# Patient Record
Sex: FEMALE | Race: WHITE | NOT HISPANIC OR LATINO | Employment: UNEMPLOYED | URBAN - METROPOLITAN AREA
[De-identification: names, ages, dates, MRNs, and addresses within clinical notes are randomized per-mention and may not be internally consistent; named-entity substitution may affect disease eponyms.]

---

## 2017-03-15 ENCOUNTER — OFFICE VISIT (OUTPATIENT)
Dept: NEUROLOGY | Facility: CLINIC | Age: 27
End: 2017-03-15

## 2017-03-15 VITALS
BODY MASS INDEX: 33.86 KG/M2 | RESPIRATION RATE: 18 BRPM | SYSTOLIC BLOOD PRESSURE: 132 MMHG | HEIGHT: 62 IN | HEART RATE: 86 BPM | WEIGHT: 184 LBS | OXYGEN SATURATION: 99 % | DIASTOLIC BLOOD PRESSURE: 96 MMHG

## 2017-03-15 DIAGNOSIS — B94.9: ICD-10-CM

## 2017-03-15 DIAGNOSIS — R20.2 PARESTHESIA: ICD-10-CM

## 2017-03-15 DIAGNOSIS — M54.2 NECK PAIN: ICD-10-CM

## 2017-03-15 DIAGNOSIS — M25.50 ARTHRALGIA OF MULTIPLE JOINTS: ICD-10-CM

## 2017-03-15 DIAGNOSIS — G43.009 MIGRAINE WITHOUT AURA AND WITHOUT STATUS MIGRAINOSUS, NOT INTRACTABLE: Primary | ICD-10-CM

## 2017-03-15 PROCEDURE — 99214 OFFICE O/P EST MOD 30 MIN: CPT | Performed by: NURSE PRACTITIONER

## 2017-03-15 RX ORDER — CYPROHEPTADINE HYDROCHLORIDE 4 MG/1
4 TABLET ORAL NIGHTLY
Qty: 30 TABLET | Refills: 5 | Status: SHIPPED | OUTPATIENT
Start: 2017-03-15 | End: 2019-06-19

## 2017-03-15 RX ORDER — SUMATRIPTAN 50 MG/1
50 TABLET, FILM COATED ORAL ONCE AS NEEDED
Qty: 9 TABLET | Refills: 5 | Status: SHIPPED | OUTPATIENT
Start: 2017-03-15 | End: 2019-06-19

## 2017-03-15 NOTE — PROGRESS NOTES
"Subjective:     Patient ID: Alexia Adam is a 26 y.o. female.    History of Present Illness   Here for 6 month follow up on post lyme disease syndrome with multiple joints/muscle pains and tingling sensations in all 4 extremities which she reports the tingling has improved since last visit. She continues to have migraines which had improved for 5 months and the last month they have returned with about 2 migraines a week that last several hours to all day. She has previously tried Topamax, Zonisamide, Amitriptyline and Gabapentin for migraines and neuropathic pains and has not tolerated any of these medications due to unwanted side effects. She tells me about 4 weeks ago she started having some muscle leg cramps along with nausea and neck stiffness which has come and gone since her Lyme disease diagnosis 12/2015. She has taken Fioricet only 1 time for a headache. She took skelaxin in past for neck stiffness but does not like the way these make her feel. She has multiple food allergies and she cannot tolerate many medications. Right now she takes Ibuprofen or Tylenol PRN migraines and lays down to sleep. She has seen her PCP who also gave her Fioricet but she tries not to use it. She is sick of feeling bad. She is willing to try anything to help her. She has tried PT in past with minimal improvement in symptoms. She also gets a \"hot poker\" pain randomly in bilateral legs or left arm which is random and lasts 2-3 minutes and then goes away without medication or intervention.     She previously saw GI and have upper GI series and was told she had in internal rash. She tried Prevacid for 2 weeks and developed a rash all over her skin and had to stop this. She also has had an intermittent dry and itchy rash come up on her legs on and off for the past few weeks. She has had no fevers or chills. She has not seen Dermatology.    MRI brain with and without contrast 6/10/2016 which was within normal limits with no " abnormal enhancing lesions and also had MRI C spine with and without contrast showing C2-C3 and C3-C4 small disc osteophytes noted but no neuro foraminal narrowing or spinal stenosis noted and no abnormal enhancing lesions. She also had EMG/NCVS BLE 8/16/2016 which was WNL.    The following portions of the patient's history were reviewed and updated as appropriate: allergies, current medications, past family history, past medical history, past social history, past surgical history and problem list.    Review of Systems   Constitutional: Positive for fatigue. Negative for chills, fever and unexpected weight change.        FEELING POORLY   HENT: Negative for ear pain, hearing loss, nosebleeds, rhinorrhea and sore throat.    Eyes: Positive for itching. Negative for photophobia, pain, discharge and visual disturbance.   Respiratory: Negative for cough, chest tightness, shortness of breath and wheezing.    Cardiovascular: Negative for chest pain, palpitations and leg swelling.   Gastrointestinal: Positive for nausea. Negative for abdominal pain, blood in stool, constipation, diarrhea and vomiting.   Genitourinary: Negative for dysuria, frequency, hematuria and urgency.   Musculoskeletal: Positive for joint swelling. Negative for arthralgias, back pain, gait problem, myalgias, neck pain and neck stiffness.        JOINT STIFFNESS   Skin: Negative for rash and wound.        ITCHING   Allergic/Immunologic: Negative for environmental allergies and food allergies.   Neurological: Positive for dizziness, weakness, numbness and headaches. Negative for tremors, seizures, syncope, speech difficulty and light-headedness.        TINGLING   Hematological: Negative for adenopathy. Bruises/bleeds easily.   Psychiatric/Behavioral: Negative for agitation, confusion, decreased concentration, hallucinations, sleep disturbance and suicidal ideas. The patient is not nervous/anxious.         Objective:    Neurologic Exam     Mental Status    Oriented to person, place, and time.   Registration: recalls 3 of 3 objects. Recall at 5 minutes: recalls 3 of 3 objects. Follows 3 step commands.   Attention: normal. Concentration: normal.   Speech: speech is normal   Level of consciousness: alert  Knowledge: good and consistent with education. Able to perform simple calculations.   Able to name object. Able to read. Able to repeat. Able to write. Normal comprehension.     Cranial Nerves     CN II   Visual fields full to confrontation.     CN III, IV, VI   Extraocular motions are normal.   Right pupil: Size: 4 mm. Shape: irregular (ovoid pupil chronic). Reactivity: brisk. Consensual response: intact. Accommodation: intact.   Left pupil: Size: 4 mm. Shape: regular. Reactivity: brisk. Consensual response: intact. Accommodation: intact.   CN III: no CN III palsy  CN VI: no CN VI palsy  Nystagmus: none   Diplopia: none  Ophthalmoparesis: none    CN V   Facial sensation intact.     CN VII   Facial expression full, symmetric.     CN VIII   CN VIII normal.     CN IX, X   CN IX normal.   CN X normal.     CN XI   CN XI normal.     CN XII   CN XII normal.        With EOMs gets dizzy and nauseated     Motor Exam   Muscle bulk: normal  Overall muscle tone: normal    Strength   Strength 5/5 throughout.     Sensory Exam   Light touch normal.   Vibration normal.   Proprioception normal.   Pinprick normal.        Hyperesthesias present but improved in all 4 extremities     Gait, Coordination, and Reflexes     Gait  Gait: normal    Coordination   Romberg: negative  Finger to nose coordination: normal  Heel to shin coordination: normal    Tremor   Resting tremor: absent  Intention tremor: absent  Action tremor: absent    Reflexes   Right brachioradialis: 2+  Left brachioradialis: 2+  Right biceps: 2+  Left biceps: 2+  Right triceps: 2+  Left triceps: 2+  Right patellar: 2+  Left patellar: 2+  Right achilles: 2+  Left achilles: 2+  Right : 2+  Left : 2+  Right plantar:  normal  Left plantar: normal  Right Abraham: absent  Left Abraham: absent  Right ankle clonus: absent  Left ankle clonus: absent       No hyperreflexia today.       Physical Exam   Constitutional: She is oriented to person, place, and time. She appears well-developed and well-nourished.   Eyes: EOM are normal.   Fundoscopic exam:       The right eye shows red reflex.        The left eye shows red reflex.   Neck: Muscular tenderness present. No spinous process tenderness present. No rigidity. Decreased range of motion present. No edema and no erythema present. No Brudzinski's sign and no Kernig's sign noted.   Neurological: She is oriented to person, place, and time. She has normal strength. She has a normal Finger-Nose-Finger Test, a normal Heel to Bedolla Test and a normal Romberg Test. Gait normal.   Reflex Scores:       Tricep reflexes are 2+ on the right side and 2+ on the left side.       Bicep reflexes are 2+ on the right side and 2+ on the left side.       Brachioradialis reflexes are 2+ on the right side and 2+ on the left side.       Patellar reflexes are 2+ on the right side and 2+ on the left side.       Achilles reflexes are 2+ on the right side and 2+ on the left side.  Psychiatric: She has a normal mood and affect. Her speech is normal and behavior is normal. Judgment and thought content normal. Cognition and memory are normal.       Assessment/Plan:     Alexia was seen today for headache, muscle pain and numbness.    Diagnoses and all orders for this visit:    Migraine without aura and without status migrainosus, not intractable  -     cyproheptadine (PERIACTIN) 4 MG tablet; Take 1 tablet by mouth Every Night.  -     SUMAtriptan (IMITREX) 50 MG tablet; Take 1 tablet by mouth 1 (One) Time As Needed for migraine for up to 1 dose.    Sequelae of infectious or parasitic diseases    Arthralgia of multiple joints    Paresthesia    Neck pain         F/U with PCP for rash and may consider seeing Dermatology.  Recommended she see Chiropractor for possible adjustments and treatment of neck pains and multiple arthralgias since diagnosis of Lyme Disease 12/2015. Also gave her names of several Naturopathic Providers in [place], [place] to consult with since she has had poor response to several prescription medications. We will try cyproheptadine for migraine prevention with severe known food allergies. Imitrex PRN headaches. F/U in 8 weeks to re-eval neck pain and migraines. She will call us with sooner concerns. Reviewed medications, potential side effects and signs and symptoms to report. Discussed risk versus benefits of treatment plan with patient and/or family-including medications, labs and radiology that may be ordered. Addressed questions and concerns during visit. Patient and/or family verbalized understanding and agree with plan. Total time of visit 25 minutes with >50% of time spent face to face counseling patient. If symptoms persist, we order additional labs next visit.

## 2017-08-07 ENCOUNTER — TRANSCRIBE ORDERS (OUTPATIENT)
Dept: ADMINISTRATIVE | Facility: HOSPITAL | Age: 27
End: 2017-08-07

## 2017-08-07 DIAGNOSIS — M79.604 DIFFUSE PAIN IN RIGHT LOWER EXTREMITY: Primary | ICD-10-CM

## 2017-08-17 ENCOUNTER — HOSPITAL ENCOUNTER (OUTPATIENT)
Dept: CARDIOLOGY | Facility: HOSPITAL | Age: 27
Discharge: HOME | End: 2017-08-17
Admitting: NURSE PRACTITIONER

## 2017-08-17 DIAGNOSIS — M79.604 DIFFUSE PAIN IN RIGHT LOWER EXTREMITY: ICD-10-CM

## 2017-08-17 LAB
BH CV LOWER VASCULAR LEFT COMMON FEMORAL AUGMENT: NORMAL
BH CV LOWER VASCULAR LEFT COMMON FEMORAL COMPRESS: NORMAL
BH CV LOWER VASCULAR LEFT COMMON FEMORAL PHASIC: NORMAL
BH CV LOWER VASCULAR LEFT COMMON FEMORAL SPONT: NORMAL
BH CV LOWER VASCULAR LEFT DISTAL FEMORAL COMPRESS: NORMAL
BH CV LOWER VASCULAR LEFT GASTRONEMIUS COMPRESS: NORMAL
BH CV LOWER VASCULAR LEFT GREATER SAPH AK COMPRESS: NORMAL
BH CV LOWER VASCULAR LEFT GREATER SAPH BK COMPRESS: NORMAL
BH CV LOWER VASCULAR LEFT LESSER SAPH COMPRESS: NORMAL
BH CV LOWER VASCULAR LEFT MID FEMORAL AUGMENT: NORMAL
BH CV LOWER VASCULAR LEFT MID FEMORAL COMPRESS: NORMAL
BH CV LOWER VASCULAR LEFT MID FEMORAL PHASIC: NORMAL
BH CV LOWER VASCULAR LEFT MID FEMORAL SPONT: NORMAL
BH CV LOWER VASCULAR LEFT PERONEAL COMPRESS: NORMAL
BH CV LOWER VASCULAR LEFT POPLITEAL AUGMENT: NORMAL
BH CV LOWER VASCULAR LEFT POPLITEAL COMPRESS: NORMAL
BH CV LOWER VASCULAR LEFT POPLITEAL PHASIC: NORMAL
BH CV LOWER VASCULAR LEFT POPLITEAL SPONT: NORMAL
BH CV LOWER VASCULAR LEFT POSTERIOR TIBIAL COMPRESS: NORMAL
BH CV LOWER VASCULAR LEFT PROXIMAL FEMORAL COMPRESS: NORMAL
BH CV LOWER VASCULAR LEFT SAPHENOFEMORAL JUNCTION AUGMENT: NORMAL
BH CV LOWER VASCULAR LEFT SAPHENOFEMORAL JUNCTION COMPRESS: NORMAL
BH CV LOWER VASCULAR LEFT SAPHENOFEMORAL JUNCTION PHASIC: NORMAL
BH CV LOWER VASCULAR LEFT SAPHENOFEMORAL JUNCTION SPONT: NORMAL
BH CV LOWER VASCULAR RIGHT COMMON FEMORAL AUGMENT: NORMAL
BH CV LOWER VASCULAR RIGHT COMMON FEMORAL COMPRESS: NORMAL
BH CV LOWER VASCULAR RIGHT COMMON FEMORAL PHASIC: NORMAL
BH CV LOWER VASCULAR RIGHT COMMON FEMORAL SPONT: NORMAL
BH CV LOWER VASCULAR RIGHT DISTAL FEMORAL COMPRESS: NORMAL
BH CV LOWER VASCULAR RIGHT GASTRONEMIUS COMPRESS: NORMAL
BH CV LOWER VASCULAR RIGHT GREATER SAPH AK COMPRESS: NORMAL
BH CV LOWER VASCULAR RIGHT GREATER SAPH BK COMPRESS: NORMAL
BH CV LOWER VASCULAR RIGHT LESSER SAPH COMPRESS: NORMAL
BH CV LOWER VASCULAR RIGHT MID FEMORAL AUGMENT: NORMAL
BH CV LOWER VASCULAR RIGHT MID FEMORAL COMPRESS: NORMAL
BH CV LOWER VASCULAR RIGHT MID FEMORAL PHASIC: NORMAL
BH CV LOWER VASCULAR RIGHT MID FEMORAL SPONT: NORMAL
BH CV LOWER VASCULAR RIGHT PERONEAL COMPRESS: NORMAL
BH CV LOWER VASCULAR RIGHT POPLITEAL AUGMENT: NORMAL
BH CV LOWER VASCULAR RIGHT POPLITEAL COMPRESS: NORMAL
BH CV LOWER VASCULAR RIGHT POPLITEAL PHASIC: NORMAL
BH CV LOWER VASCULAR RIGHT POPLITEAL SPONT: NORMAL
BH CV LOWER VASCULAR RIGHT POSTERIOR TIBIAL COMPRESS: NORMAL
BH CV LOWER VASCULAR RIGHT PROXIMAL FEMORAL COMPRESS: NORMAL
BH CV LOWER VASCULAR RIGHT SAPHENOFEMORAL JUNCTION AUGMENT: NORMAL
BH CV LOWER VASCULAR RIGHT SAPHENOFEMORAL JUNCTION COMPRESS: NORMAL
BH CV LOWER VASCULAR RIGHT SAPHENOFEMORAL JUNCTION PHASIC: NORMAL
BH CV LOWER VASCULAR RIGHT SAPHENOFEMORAL JUNCTION SPONT: NORMAL

## 2017-08-17 PROCEDURE — 93970 EXTREMITY STUDY: CPT

## 2018-04-19 ENCOUNTER — TRANSCRIBE ORDERS (OUTPATIENT)
Dept: ADMINISTRATIVE | Facility: HOSPITAL | Age: 28
End: 2018-04-19

## 2018-04-19 DIAGNOSIS — R10.9 FLANK PAIN: Primary | ICD-10-CM

## 2018-04-20 ENCOUNTER — HOSPITAL ENCOUNTER (OUTPATIENT)
Dept: CT IMAGING | Facility: HOSPITAL | Age: 28
Discharge: HOME | End: 2018-04-20
Admitting: NURSE PRACTITIONER

## 2018-04-20 DIAGNOSIS — R10.9 FLANK PAIN: ICD-10-CM

## 2018-04-20 PROCEDURE — 74176 CT ABD & PELVIS W/O CONTRAST: CPT

## 2018-06-21 ENCOUNTER — TRANSCRIBE ORDERS (OUTPATIENT)
Dept: ADMINISTRATIVE | Facility: HOSPITAL | Age: 28
End: 2018-06-21

## 2018-06-21 DIAGNOSIS — R11.2 NAUSEA AND VOMITING, INTRACTABILITY OF VOMITING NOT SPECIFIED, UNSPECIFIED VOMITING TYPE: Primary | ICD-10-CM

## 2018-06-29 ENCOUNTER — LAB REQUISITION (OUTPATIENT)
Dept: LAB | Facility: HOSPITAL | Age: 28
End: 2018-06-29

## 2018-06-29 ENCOUNTER — HOSPITAL ENCOUNTER (OUTPATIENT)
Dept: ULTRASOUND IMAGING | Facility: HOSPITAL | Age: 28
Discharge: HOME | End: 2018-06-29
Attending: INTERNAL MEDICINE | Admitting: INTERNAL MEDICINE

## 2018-06-29 DIAGNOSIS — R11.2 NAUSEA AND VOMITING, INTRACTABILITY OF VOMITING NOT SPECIFIED, UNSPECIFIED VOMITING TYPE: ICD-10-CM

## 2018-06-29 DIAGNOSIS — R11.2 NAUSEA WITH VOMITING: ICD-10-CM

## 2018-06-29 PROCEDURE — 88305 TISSUE EXAM BY PATHOLOGIST: CPT | Performed by: INTERNAL MEDICINE

## 2018-06-29 PROCEDURE — 76705 ECHO EXAM OF ABDOMEN: CPT

## 2018-07-02 LAB
CYTO UR: NORMAL
LAB AP CASE REPORT: NORMAL
LAB AP CLINICAL INFORMATION: NORMAL
PATH REPORT.FINAL DX SPEC: NORMAL
PATH REPORT.GROSS SPEC: NORMAL

## 2019-06-17 ENCOUNTER — TRANSCRIBE ORDERS (OUTPATIENT)
Dept: ADMINISTRATIVE | Facility: HOSPITAL | Age: 29
End: 2019-06-17

## 2019-06-17 ENCOUNTER — HOSPITAL ENCOUNTER (OUTPATIENT)
Dept: GENERAL RADIOLOGY | Facility: HOSPITAL | Age: 29
Discharge: HOME | End: 2019-06-17
Admitting: STUDENT IN AN ORGANIZED HEALTH CARE EDUCATION/TRAINING PROGRAM

## 2019-06-17 DIAGNOSIS — M54.42 ACUTE MIDLINE LOW BACK PAIN WITH BILATERAL SCIATICA: Primary | ICD-10-CM

## 2019-06-17 DIAGNOSIS — M54.41 ACUTE MIDLINE LOW BACK PAIN WITH BILATERAL SCIATICA: Primary | ICD-10-CM

## 2019-06-17 PROCEDURE — 72114 X-RAY EXAM L-S SPINE BENDING: CPT

## 2019-06-19 ENCOUNTER — OFFICE VISIT (OUTPATIENT)
Dept: NEUROLOGY | Facility: CLINIC | Age: 29
End: 2019-06-19

## 2019-06-19 VITALS
HEIGHT: 62 IN | WEIGHT: 186 LBS | OXYGEN SATURATION: 97 % | HEART RATE: 64 BPM | DIASTOLIC BLOOD PRESSURE: 92 MMHG | SYSTOLIC BLOOD PRESSURE: 146 MMHG | BODY MASS INDEX: 34.23 KG/M2

## 2019-06-19 DIAGNOSIS — M54.41 ACUTE MIDLINE LOW BACK PAIN WITH BILATERAL SCIATICA: ICD-10-CM

## 2019-06-19 DIAGNOSIS — M79.10 MYALGIA: Primary | ICD-10-CM

## 2019-06-19 DIAGNOSIS — R20.2 PARESTHESIA: ICD-10-CM

## 2019-06-19 DIAGNOSIS — M25.40 JOINT SWELLING: ICD-10-CM

## 2019-06-19 DIAGNOSIS — M54.42 ACUTE MIDLINE LOW BACK PAIN WITH BILATERAL SCIATICA: ICD-10-CM

## 2019-06-19 DIAGNOSIS — R21 RASH: ICD-10-CM

## 2019-06-19 PROCEDURE — 99214 OFFICE O/P EST MOD 30 MIN: CPT | Performed by: NURSE PRACTITIONER

## 2019-06-19 RX ORDER — CYCLOBENZAPRINE HCL 5 MG
5 TABLET ORAL 3 TIMES DAILY PRN
COMMUNITY
Start: 2019-06-13 | End: 2019-08-15

## 2019-06-19 RX ORDER — MELOXICAM 15 MG/1
15 TABLET ORAL DAILY
COMMUNITY
Start: 2019-06-13 | End: 2019-08-15

## 2019-06-19 NOTE — PROGRESS NOTES
Subjective:     Patient ID: Alexia Adam is a 28 y.o. female.    CC:   Chief Complaint   Patient presents with   • Pain   • Gait Problem   • Numbness       HPI:   History of Present Illness   This is a 27 y/o female who presents after 2 year hiatus to discuss worsening and new symptoms. She tells me her Rheumatologist recommended she come back to neurology. She was previously followed and last seen 3/15/2017 in our office for post lyme disease syndrome with multiple joints/muscle pains and tingling sensations in all 4 extremities. She was also experiencing migraines at the time. She has previously tried Topamax, Zonisamide, Amitriptyline and Gabapentin for migraines and neuropathic pains and has not tolerated any of these medications due to unwanted side effects. Lyme disease diagnosis 12/2015. She denies any migraines in the past several months.    She tells me a about 2 months ago she started having a weird rash on her arms and hands.  She tells me that she was given a steroid orally and a cream and this is clearing up somewhat but the right wrist rash is still present.  She tells me she also started having really hot and swollen hands and feet over the past 2 to 3 months.  She tells me she recently saw rheumatology at [place] [address] but she cannot remember which provider and she tells me that initially when she went and she did not have the blood work done because of some type of personal issue but this time 1 month ago she went in and did have all of her labs checked.  She tells me she is not sure about the results yet.  She tells me several tests were run and she is waiting to hear.  She is scheduled to follow-up with them soon.  She tells me that they recommended that she come back here because they were not entirely convinced that she had post Lyme disease.     Prior Extensive Workup: MRI brain with and without contrast 6/10/2016 which was within normal limits with no abnormal enhancing lesions and also had  MRI C spine with and without contrast showing C2-C3 and C3-C4 small disc osteophytes noted but no neuro foraminal narrowing or spinal stenosis noted and no abnormal enhancing lesions. She also had EMG/NCVS BLE 8/16/2016 which was WNL.  She also underwent a lumbar puncture on November 30, 2015 and the Lyme disease PCR was negative on CSF, glucose CV SF was 52, protein CSF normal at 25, cell count CSF normal, culture CSF negative, cryptococcus antigen negative, rheumatoid factor at that time normal.  CRP was normal.  CMP was within normal limits.  Sed rate was normal at 6 and CBC with differential was within normal limits.  She had been diagnosed with Lyme disease by her primary care provider about 1.5 months prior to her LP/ER workup at [facility].    She also has a new concern today of acute middle lower back pain present x1 week.  She went and saw her primary care provider Dr. Grigsby last week and had x-rays of her lumbar spine which she reports were normal.  She tells me she has had no injury but has suddenly had a severe pain in the midline lower back which radiates with numbness and tingling into both hips and down both legs.  She denies any urinary or bowel incontinence or hesitancy, denies perineal numbness.  She denies weakness in her lower extremities but has severe pain with sitting, standing or any movement with her lower back.  She denies any UTI symptoms abdominal pain or urinary frequency or dysuria.  She has been taking a muscle relaxer Flexeril as well as meloxicam for the past week with no improvement in symptoms.  She has not had a recent steroid.  She has not gone to physical therapy.  She is tearful and in excruciating pain today.    The following portions of the patient's history were reviewed and updated as appropriate: allergies, current medications, past family history, past medical history, past social history, past surgical history and problem list.    Past Medical History:   Diagnosis Date   •  Lyme disease, acute    • Pupillary abnormalities 01/14/2016    resolved       Past Surgical History:   Procedure Laterality Date   • TONSILECTOMY, ADENOIDECTOMY, BILATERAL MYRINGOTOMY AND TUBES         Social History     Socioeconomic History   • Marital status: Single     Spouse name: Not on file   • Number of children: Not on file   • Years of education: Not on file   • Highest education level: Not on file   Tobacco Use   • Smoking status: Never Smoker   Substance and Sexual Activity   • Alcohol use: No   • Drug use: No       Family History   Problem Relation Age of Onset   • Heart disease Mother         cardiac disorder   • Hypertension Mother    • Cancer Mother         Review of Systems   Constitutional: Negative for chills, fatigue, fever and unexpected weight change.   HENT: Negative for ear pain, hearing loss, nosebleeds, rhinorrhea and sore throat.    Eyes: Negative for photophobia, pain, discharge, itching and visual disturbance.   Respiratory: Negative for cough, chest tightness, shortness of breath and wheezing.    Cardiovascular: Negative for chest pain, palpitations and leg swelling.   Gastrointestinal: Negative for abdominal pain, blood in stool, constipation, diarrhea, nausea and vomiting.   Genitourinary: Negative for dysuria, frequency, hematuria and urgency.   Musculoskeletal: Positive for back pain, gait problem and neck stiffness. Negative for arthralgias, joint swelling, myalgias and neck pain.        LEGS   Skin: Negative for rash and wound.   Allergic/Immunologic: Negative for environmental allergies and food allergies.   Neurological: Positive for numbness. Negative for dizziness, tremors, seizures, syncope, speech difficulty, weakness, light-headedness and headaches.   Hematological: Negative for adenopathy. Does not bruise/bleed easily.   Psychiatric/Behavioral: Negative for agitation, confusion, decreased concentration, hallucinations, sleep disturbance and suicidal ideas. The patient is  not nervous/anxious.         Objective:    Neurologic Exam     Mental Status   Oriented to person, place, and time.   Speech: speech is normal   Level of consciousness: alert    Cranial Nerves     CN II   Visual fields full to confrontation.     CN III, IV, VI   Right pupil: Size: 4 (ovoid pupil chronic) mm. Shape: regular. Reactivity: brisk. Consensual response: intact. Accommodation: intact.   Left pupil: Size: 4 mm. Shape: regular. Reactivity: brisk. Consensual response: intact. Accommodation: intact.   CN III: no CN III palsy  CN VI: no CN VI palsy  Nystagmus: none   Diplopia: none  Ophthalmoparesis: none  Upgaze: normal  Downgaze: normal    CN V   Facial sensation intact.     CN VII   Facial expression full, symmetric.     CN VIII   CN VIII normal.     CN IX, X   CN IX normal.   CN X normal.     CN XI   CN XI normal.     CN XII   CN XII normal.     Dizziness with EOMs has been chronic since 2015     Motor Exam   Muscle bulk: normal  Overall muscle tone: normal    Strength   Strength 5/5 except as noted.   Severely limited ROM of Lumbar spine     Sensory Exam   Right arm light touch: normal  Left arm light touch: normal  Right leg light touch: decreased from ankle  Left leg light touch: decreased from ankle  Right arm vibration: normal  Left arm vibration: normal  Right leg vibration: decreased from ankle  Left leg vibration: decreased from ankle  Proprioception normal.   Pinprick normal.     Gait, Coordination, and Reflexes     Gait  Gait: (antalgic, stooped d/t LBP)    Coordination   Finger to nose coordination: normal    Tremor   Resting tremor: absent  Intention tremor: absent  Action tremor: absent    Reflexes   Right brachioradialis: 2+  Left brachioradialis: 2+  Right biceps: 2+  Left biceps: 2+  Right triceps: 2+  Left triceps: 2+  Right patellar: 2+  Left patellar: 2+  Right achilles: 2+  Left achilles: 2+  Right : 2+  Left : 2+  Right plantar: normal  Left plantar: normal      Physical Exam    Constitutional: She is oriented to person, place, and time.   Musculoskeletal:        Lumbar back: She exhibits decreased range of motion, tenderness, bony tenderness and pain. She exhibits no swelling, no edema, no deformity, no laceration and no spasm.     +SLR severe-patient crying and grimacing during exam   Neurological: She is oriented to person, place, and time. She has a normal Finger-Nose-Finger Test.   Reflex Scores:       Tricep reflexes are 2+ on the right side and 2+ on the left side.       Bicep reflexes are 2+ on the right side and 2+ on the left side.       Brachioradialis reflexes are 2+ on the right side and 2+ on the left side.       Patellar reflexes are 2+ on the right side and 2+ on the left side.       Achilles reflexes are 2+ on the right side and 2+ on the left side.  Skin: Rash (both hands and wrists, faint, erythematous, lacy rash) noted.   Psychiatric: Her speech is normal and behavior is normal. Judgment and thought content normal. Her mood appears anxious. Cognition and memory are normal.       Assessment/Plan:       Alexia was seen today for pain, gait problem and numbness.    Diagnoses and all orders for this visit:    Myalgia  Comments:  requested recent labs from rheumatology-collected 5/2019-pending    Acute midline low back pain with bilateral sciatica  Comments:  xray L spine by PCP 1 wk ago normal, denies injury, taking meloxicam and flexeril, mild relief  Orders:  -     Ambulatory Referral to Physical Therapy Evaluate and treat  -     MRI Lumbar Spine Without Contrast; Future    Paresthesia  Comments:  hands and feet chronic since 2015. requested recent labs from rheumatology-collected 5/2019-pending    Joint swelling  Comments:  hands and feet x 2-3 months. requested recent labs from rheumatology-collected 5/2019-pending    Rash  Comments:  hands x 2 months. treated steroids and cream slightly better           I have requested recent labs from rheumatology as well as most  recent visit notes to see any other further recommendations for work-up with neurology.  We could always repeat her EMG and NCV S.  Would like to ensure they did check her CK but she does deny any weakness today.  She does have acute findings of severe lower back pain with radicular symptoms.  I have ordered physical therapy but have also recommended going ahead and doing an MRI of the lumbar spine without contrast to rule out herniated disks.  She will continue her meloxicam and muscle relaxer.  She does have swelling as well as a rash on the hands and we will wait to see what rheumatology has to say.  Could also consider seeing dermatology. Have her follow-up in 4 to 6 weeks for reevaluation.  We will call her with the results of the MRI of the lumbar spine.  I did review signs and symptoms of cauda equina syndrome as well as any significant perineal numbness, weakness or worsening symptoms to go to the ER immediately for further work-up. Her mother is with her today and they both verbalize understanding. Reviewed medications, potential side effects and signs and symptoms to report. Discussed risk versus benefits of treatment plan with patient and/or family-including medications, labs and radiology that may be ordered. Addressed questions and concerns during visit. Patient and/or family verbalized understanding and agree with plan.    During this visit the following were done:  Labs Reviewed [x]    Labs Ordered []    Radiology Reports Reviewed [x]    Radiology Ordered [x]    PCP Records Reviewed [x]    Referring Provider Records Reviewed []    ER Records Reviewed []    Hospital Records Reviewed []    History Obtained From Family []    Radiology Images Reviewed []    Other Reviewed []    Records Requested [x] rheumatology all records and recent labs/recommendations     EMR Dragon/Transcription Disclaimer:  Much of this encounter note is an electronic transcription of spoken language to printed text. Electronic  transcription of spoken language may permit erroneous words or phrases to be inadvertently transcribed. Although I have reviewed the note for such errors, some may still exist in this documentation.    Sandy Mclaughlin, APRN  6/20/2019

## 2019-06-25 ENCOUNTER — TELEPHONE (OUTPATIENT)
Dept: NEUROLOGY | Facility: CLINIC | Age: 29
End: 2019-06-25

## 2019-06-25 ENCOUNTER — HOSPITAL ENCOUNTER (OUTPATIENT)
Dept: MRI IMAGING | Facility: HOSPITAL | Age: 29
Discharge: HOME | End: 2019-06-25
Admitting: NURSE PRACTITIONER

## 2019-06-25 DIAGNOSIS — M54.41 ACUTE MIDLINE LOW BACK PAIN WITH BILATERAL SCIATICA: ICD-10-CM

## 2019-06-25 DIAGNOSIS — M54.42 ACUTE MIDLINE LOW BACK PAIN WITH BILATERAL SCIATICA: ICD-10-CM

## 2019-06-25 PROCEDURE — 72148 MRI LUMBAR SPINE W/O DYE: CPT

## 2019-06-25 NOTE — TELEPHONE ENCOUNTER
----- Message from Sharlene Basurto sent at 6/25/2019  7:54 AM EDT -----  Regarding: RACHELE LOVELACE FROM [facility] CALLED [phone]  EXT  [id number] REFERENCE #[id number] TO CONFIRM DIAGNOSIS AND TREATMENT PLAN, AND KEEPING HER OFF WORK, AND RESTRICTIONS

## 2019-06-27 DIAGNOSIS — M51.36 BULGING LUMBAR DISC: Primary | ICD-10-CM

## 2019-06-28 ENCOUNTER — TELEPHONE (OUTPATIENT)
Dept: NEUROLOGY | Facility: CLINIC | Age: 29
End: 2019-06-28

## 2019-06-28 NOTE — TELEPHONE ENCOUNTER
----- Message from DIMITRI George sent at 6/28/2019  9:24 AM EDT -----  Discussed MRI L spine findings by phone on 6/27/19 with patient and made referral to Neurosurgery for further evaluation. She is in severe pain. She is aware to await details of appointment with neurosurgery for further recommendations. She is going to PT, but tells me it is exacerbating her symptoms. DIMITRI Marshall

## 2019-06-28 NOTE — PROGRESS NOTES
Discussed MRI L spine findings by phone on 6/27/19 with patient and made referral to Neurosurgery for further evaluation. She is in severe pain. She is aware to await details of appointment with neurosurgery for further recommendations. She is going to PT, but tells me it is exacerbating her symptoms. KELLY Mclaughlin, APRN

## 2019-07-03 ENCOUNTER — OFFICE VISIT (OUTPATIENT)
Dept: NEUROSURGERY | Facility: CLINIC | Age: 29
End: 2019-07-03

## 2019-07-03 VITALS — BODY MASS INDEX: 34.78 KG/M2 | WEIGHT: 189 LBS | TEMPERATURE: 98.2 F | HEIGHT: 62 IN

## 2019-07-03 DIAGNOSIS — M54.9 MECHANICAL BACK PAIN: Primary | ICD-10-CM

## 2019-07-03 DIAGNOSIS — M51.36 DEGENERATIVE DISC DISEASE, LUMBAR: ICD-10-CM

## 2019-07-03 DIAGNOSIS — M51.36 BULGING LUMBAR DISC: ICD-10-CM

## 2019-07-03 PROCEDURE — 99243 OFF/OP CNSLTJ NEW/EST LOW 30: CPT | Performed by: NEUROLOGICAL SURGERY

## 2019-07-03 RX ORDER — DIPHENHYDRAMINE HCL 25 MG
25 TABLET ORAL AS NEEDED
COMMUNITY
End: 2020-11-09 | Stop reason: HOSPADM

## 2019-07-03 NOTE — PROGRESS NOTES
Patient: Alexia Adam  : 1990    Primary Care Provider: Murray Wei APRN    Requesting Provider: As above        History    Chief Complaint: Low back pain.    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  She simply awoke with the symptoms.  She denies any precipitating or inciting events.  She has just started physical therapy.  She has had tingling in her legs since  when she was diagnosed with Lyme disease.  Ibuprofen is helpful.  She does have some pain extending into the back of her thighs.  She is better with sitting and worse with standing or walking.  She is not currently working.    Review of Systems   Constitutional: Positive for fatigue. Negative for activity change, appetite change, chills, diaphoresis, fever and unexpected weight change.   HENT: Negative for congestion, dental problem, drooling, ear discharge, ear pain, facial swelling, hearing loss, mouth sores, nosebleeds, postnasal drip, rhinorrhea, sinus pressure, sinus pain, sneezing, sore throat, tinnitus, trouble swallowing and voice change.    Eyes: Negative for photophobia, pain, discharge, redness, itching and visual disturbance.   Respiratory: Negative for apnea, cough, choking, chest tightness, shortness of breath, wheezing and stridor.    Cardiovascular: Positive for leg swelling. Negative for chest pain and palpitations.   Gastrointestinal: Positive for nausea. Negative for abdominal distention, abdominal pain, anal bleeding, blood in stool, constipation, diarrhea, rectal pain and vomiting.   Endocrine: Positive for polyuria. Negative for cold intolerance, heat intolerance, polydipsia and polyphagia.   Genitourinary: Negative for decreased urine volume, difficulty urinating, dyspareunia, dysuria, enuresis, flank pain, frequency, genital sores, hematuria, menstrual problem, pelvic pain, urgency, vaginal bleeding, vaginal  "discharge and vaginal pain.   Musculoskeletal: Positive for arthralgias, back pain, joint swelling, myalgias and neck pain. Negative for gait problem and neck stiffness.   Skin: Negative for color change, pallor, rash and wound.   Allergic/Immunologic: Positive for food allergies. Negative for environmental allergies and immunocompromised state.   Neurological: Positive for weakness. Negative for dizziness, tremors, seizures, syncope, facial asymmetry, speech difficulty, light-headedness, numbness and headaches.   Hematological: Negative for adenopathy. Does not bruise/bleed easily.   Psychiatric/Behavioral: Negative for agitation, behavioral problems, confusion, decreased concentration, dysphoric mood, hallucinations, self-injury, sleep disturbance and suicidal ideas. The patient is not nervous/anxious and is not hyperactive.        The patient's past medical history, past surgical history, family history, and social history have been reviewed at length in the electronic medical record.    Physical Exam:   Temp 98.2 °F (36.8 °C) (Temporal)   Ht 157.5 cm (62\")   Wt 85.7 kg (189 lb)   BMI 34.57 kg/m²   CONSTITUTIONAL: Patient is well-nourished, pleasant and appears stated age.  CV: Heart regular rate and rhythm without murmur, rub, or gallop.  PULMONARY: Lungs are clear to ascultation.  MUSCULOSKELETAL:  Straight leg raising is negative.  Jose Alfredo's Sign is negative.  ROM in back normal.  Tenderness in the back to palpation is rather dramatically present in her low back.  NEUROLOGICAL:  Orientation, memory, attention span, language function, and cognition have been examined and are intact.  Strength is intact in the lower extremities to direct testing.  Muscle tone is normal throughout.  Station and gait are normal.  Sensation is intact to light touch testing throughout.  Deep tendon reflexes are 1+ and symmetrical.  Coordination is intact.      Medical Decision Making    Data Review:   MRI of the lumbar spine " dated 6/25/2019 demonstrates some loss of signal at L5-S1 where there is some central to left-sided protrusion.  No significant canal compromise is noted.    Diagnosis:   1..  Lumbar degenerative disc disease.  I do not see evidence of an active radiculopathy.  2.  Mechanical low back pain.    Treatment Options:   I have referred her for more physical therapy and treatment should remain symptomatic.  She will follow-up in our clinic in several weeks.       Diagnosis Plan   1. Lumbar radiculopathy  Ambulatory Referral to Physical Therapy Evaluate and treat; Stretching, ROM, Strengthening   2. Bulging lumbar disc     3. Degenerative disc disease, lumbar         Scribed for Yony Garzon MD by Milvia Baptiste CMA on 07/03/2019 at 8:32 AM      I, Dr. Garzon, personally performed the services described in the documentation, as scribed in my presence, and it is both accurate and complete.

## 2019-07-08 ENCOUNTER — TELEPHONE (OUTPATIENT)
Dept: NEUROLOGY | Facility: CLINIC | Age: 29
End: 2019-07-08

## 2019-07-08 NOTE — TELEPHONE ENCOUNTER
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  Dr. Krueger is seeing her again in four weeks.   He will reevaluate then. Pt wants to know if you think she should stay off work or go back.

## 2019-07-08 NOTE — TELEPHONE ENCOUNTER
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  Dr. Krueger is seeing her again in four weeks.   He will reevaluate then. Pt wants to know if you think she should stay off work or go back.    I never addressed the patient's work status. I would recommend she complete physical therapy per Dr. Garzon and our recommendation and stay off work until she comes in for follow up on 7/31/2019 and then we should be able to release her OR if she wants to come in sooner I can release her if she feels she is ready to go back. We want her back as soon as possible, but she needs to get some PT completed so she can return to work without further issues. Thanks.

## 2019-07-09 NOTE — TELEPHONE ENCOUNTER
Pt is aware of the recommendations concerning PT per Dr Parker's office and to remain off work until she comes in for a f/u.  Pt opted to keep her 7-

## 2019-07-31 ENCOUNTER — OFFICE VISIT (OUTPATIENT)
Dept: NEUROLOGY | Facility: CLINIC | Age: 29
End: 2019-07-31

## 2019-07-31 VITALS
HEART RATE: 99 BPM | DIASTOLIC BLOOD PRESSURE: 80 MMHG | OXYGEN SATURATION: 99 % | WEIGHT: 192 LBS | BODY MASS INDEX: 35.33 KG/M2 | HEIGHT: 62 IN | SYSTOLIC BLOOD PRESSURE: 138 MMHG

## 2019-07-31 DIAGNOSIS — M79.7 FIBROMYALGIA: Primary | ICD-10-CM

## 2019-07-31 DIAGNOSIS — M51.36 DDD (DEGENERATIVE DISC DISEASE), LUMBAR: ICD-10-CM

## 2019-07-31 DIAGNOSIS — M54.59 MECHANICAL LOW BACK PAIN: ICD-10-CM

## 2019-07-31 PROBLEM — M51.369 DDD (DEGENERATIVE DISC DISEASE), LUMBAR: Status: ACTIVE | Noted: 2019-07-31

## 2019-07-31 PROCEDURE — 99214 OFFICE O/P EST MOD 30 MIN: CPT | Performed by: NURSE PRACTITIONER

## 2019-07-31 RX ORDER — DULOXETIN HYDROCHLORIDE 30 MG/1
30 CAPSULE, DELAYED RELEASE ORAL DAILY
Qty: 30 CAPSULE | Refills: 2 | Status: SHIPPED | OUTPATIENT
Start: 2019-07-31 | End: 2019-12-09 | Stop reason: SDUPTHER

## 2019-07-31 RX ORDER — ALBUTEROL SULFATE 2.5 MG/3ML
2.5 SOLUTION RESPIRATORY (INHALATION) EVERY 4 HOURS PRN
COMMUNITY
End: 2019-08-15

## 2019-07-31 RX ORDER — METHYLPREDNISOLONE 4 MG/1
TABLET ORAL
Qty: 21 EACH | Refills: 0 | Status: SHIPPED | OUTPATIENT
Start: 2019-07-31 | End: 2019-08-15

## 2019-07-31 NOTE — PROGRESS NOTES
Subjective:     Patient ID: Aleixa Adam is a 29 y.o. female.    CC:   Chief Complaint   Patient presents with   • Fibromyalgia   • Back Pain       HPI:   History of Present Illness   This is a 30 y/o female who presents for 6 week follow up on acute LBP present about 7 weeks with no known injury as well as chronic pains following possible lyme disease in 2015.  Last visit she has had an MRI of the lumbar spine without contrast completed on 6/25/2019 showing a disc bulge with left-sided nerve root contact and she has been evaluated by neurosurgery Dr. Krueger on 7/3/2019 and he has diagnosed her with lumbar degenerative disc disease without an active radiculopathy as well as mechanical lower back pain.  He recommended she try physical therapy and he is scheduled to see her on 8/7/2019 for follow-up.  She tells me that she has been going to physical therapy but she seems to get worse after each visit.  She tells me she has used the meloxicam and the Flexeril as needed but the Flexeril does not really help and just makes her sleep.  She has not done a recent steroid.  She has constant low back pain and is very tender to touch in the lumbar spine region.  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  She does want to be able to return to work and is motivated to get better.  She is not able to return to work with any restrictions. Pain is moderate and staying the same. Worse with moving, bending, twisting and standing, better with sitting and laying flat.    Recently saw Rheumatology again here locally and they did additional extensive lab work which was unremarkable and reviewing notes and they recommended she follow up with our office to treat her for Fibromyalgia. She was told by [facility] in 2015 she likely had this as well. Has chronic numbness, tingling, burning sensation x all 4 extremities. Desperate for relief and willing to try a different  medication. Migraines are episodic and only every few months now.  She has previously tried Topamax, Zonisamide, Amitriptyline and Gabapentin for migraines and neuropathic pains and has not tolerated any of these medications due to unwanted side effects. Lyme disease diagnosis 12/2015.      Prior Extensive Workup: MRI brain with and without contrast 6/10/2016 which was within normal limits with no abnormal enhancing lesions and also had MRI C spine with and without contrast showing C2-C3 and C3-C4 small disc osteophytes noted but no neuro foraminal narrowing or spinal stenosis noted and no abnormal enhancing lesions. She also had EMG/NCVS BLE 8/16/2016 which was WNL.  She also underwent a lumbar puncture on November 30, 2015 and the Lyme disease PCR was negative on CSF, glucose CV SF was 52, protein CSF normal at 25, cell count CSF normal, culture CSF negative, cryptococcus antigen negative, rheumatoid factor at that time normal.  CRP was normal.  CMP was within normal limits.  Sed rate was normal at 6 and CBC with differential was within normal limits.  She had been diagnosed with Lyme disease by her primary care provider about 1.5 months prior to her LP/ER workup at [facility].    The following portions of the patient's history were reviewed and updated as appropriate: allergies, current medications, past family history, past medical history, past social history, past surgical history and problem list.    Past Medical History:   Diagnosis Date   • Asthma    • Lyme disease, acute    • Pupillary abnormalities 01/14/2016    resolved       Past Surgical History:   Procedure Laterality Date   • TONSILECTOMY, ADENOIDECTOMY, BILATERAL MYRINGOTOMY AND TUBES         Social History     Socioeconomic History   • Marital status: Single     Spouse name: Not on file   • Number of children: Not on file   • Years of education: Not on file   • Highest education level: Not on file   Tobacco Use   • Smoking status: Never Smoker   •  Smokeless tobacco: Never Used   Substance and Sexual Activity   • Alcohol use: No   • Drug use: No       Family History   Problem Relation Age of Onset   • Heart disease Mother         cardiac disorder   • Hypertension Mother    • Cancer Mother         Review of Systems   Constitutional: Negative for chills, fatigue, fever and unexpected weight change.   HENT: Negative for ear pain, hearing loss, nosebleeds, rhinorrhea and sore throat.    Eyes: Negative for photophobia, pain, discharge, itching and visual disturbance.   Respiratory: Negative for cough, chest tightness, shortness of breath and wheezing.    Cardiovascular: Negative for chest pain, palpitations and leg swelling.   Gastrointestinal: Negative for abdominal pain, blood in stool, constipation, diarrhea, nausea and vomiting.   Genitourinary: Negative for dysuria, frequency, hematuria and urgency.   Musculoskeletal: Positive for back pain. Negative for arthralgias, gait problem, joint swelling, myalgias, neck pain and neck stiffness.   Skin: Negative for rash and wound.   Allergic/Immunologic: Negative for environmental allergies and food allergies.   Neurological: Positive for headaches. Negative for dizziness, tremors, seizures, syncope, speech difficulty, weakness, light-headedness and numbness.   Hematological: Negative for adenopathy. Does not bruise/bleed easily.   Psychiatric/Behavioral: Negative for agitation, confusion, decreased concentration, hallucinations, sleep disturbance and suicidal ideas. The patient is not nervous/anxious.    All other systems reviewed and are negative.       Objective:    Neurologic Exam     Mental Status   Oriented to person, place, and time.   Level of consciousness: alert    Cranial Nerves   Cranial nerves II through XII intact.     CN III, IV, VI   Extraocular motions are normal.   Pupils: unequal  Right pupil: Size: 4 (ovoid-chronic) mm. Shape: regular. Reactivity: brisk. Consensual response: intact.   Left pupil:  Size: 4 (round) mm. Shape: regular. Reactivity: brisk. Consensual response: intact.     Motor Exam   Muscle bulk: normal  Overall muscle tone: normal    Strength   Strength 5/5 throughout.     Sensory Exam   Right leg light touch: normal  Left leg light touch: normal  Right leg vibration: normal  Left leg vibration: normal  Right leg proprioception: normal  Left leg proprioception: normal  Right leg pinprick: normal  Left leg pinprick: normal    Gait, Coordination, and Reflexes     Gait  Gait: (antalgic d/t lbp, guarded)    Coordination   Finger to nose coordination: normal    Tremor   Resting tremor: absent  Intention tremor: absent  Action tremor: absent    Reflexes   Right brachioradialis: 2+  Left brachioradialis: 2+  Right biceps: 2+  Left biceps: 2+  Right triceps: 2+  Left triceps: 2+  Right patellar: 2+  Left patellar: 2+  Right achilles: 2+  Left achilles: 2+  Right : 2+  Left : 2+  Right plantar: normal  Left plantar: normal      Physical Exam   Constitutional: She is oriented to person, place, and time.   Eyes: EOM are normal. Pupils are unequal.   Musculoskeletal:        Right shoulder: She exhibits tenderness.        Left shoulder: She exhibits tenderness.        Right wrist: She exhibits tenderness.        Left wrist: She exhibits tenderness.        Right knee: Tenderness found.        Left knee: Tenderness found.        Right ankle: Tenderness.        Left ankle: Tenderness.        Lumbar back: She exhibits decreased range of motion, tenderness, bony tenderness, pain and spasm.   Neurological: She is oriented to person, place, and time. She has normal strength. She has a normal Finger-Nose-Finger Test.   Reflex Scores:       Tricep reflexes are 2+ on the right side and 2+ on the left side.       Bicep reflexes are 2+ on the right side and 2+ on the left side.       Brachioradialis reflexes are 2+ on the right side and 2+ on the left side.       Patellar reflexes are 2+ on the right side and 2+  on the left side.       Achilles reflexes are 2+ on the right side and 2+ on the left side.  Psychiatric: Her mood appears anxious (tearful d/t pain).       Assessment/Plan:       Alexia was seen today for fibromyalgia and back pain.    Diagnoses and all orders for this visit:    Fibromyalgia  -     DULoxetine (CYMBALTA) 30 MG capsule; Take 1 capsule by mouth Daily.    DDD (degenerative disc disease), lumbar  Comments:  continue PT and f/u with neurosurgery 8/7/19  Orders:  -     methylPREDNISolone (MEDROL) 4 MG tablet; Take as directed on package instructions.    Mechanical low back pain  Comments:  continue PT and f/u with neurosurgery 8/7/19  Orders:  -     methylPREDNISolone (MEDROL) 4 MG tablet; Take as directed on package instructions.           We are going to give her a trial of Cymbalta 30 mg daily.  We are going to have her follow-up in our office in 8 weeks for reevaluation.  If she cannot tolerate the medication or see some improvement but not enough she can call for an increase in dose before she returns.  She should continue physical therapy.  I have given her a short Medrol Dosepak for inflammation to help since she is going to therapy.  I have encouraged her to keep her follow-up with neurosurgery on 8/7/2019 to discuss further recommendations.  At this time I do not believe she could return to work without restrictions but will defer to neurosurgery upon further recommendations.  Her goal is to return to work and she is very motivated to get better quickly. Reviewed medications, potential side effects and signs and symptoms to report. Discussed risk versus benefits of treatment plan with patient and/or family-including medications, labs and radiology that may be ordered. Addressed questions and concerns during visit. Patient and/or family verbalized understanding and agree with plan.    During this visit the following were done:  Labs Reviewed [x]    Labs Ordered []    Radiology Reports Reviewed  [x]    Radiology Ordered []    PCP Records Reviewed []    Referring Provider Records Reviewed []    ER Records Reviewed []    Hospital Records Reviewed []    History Obtained From Family []    Radiology Images Reviewed [x]    Other Reviewed [x]    Records Requested []      EMR Dragon/Transcription Disclaimer:  Much of this encounter note is an electronic transcription of spoken language to printed text. Electronic transcription of spoken language may permit erroneous words or phrases to be inadvertently transcribed. Although I have reviewed the note for such errors, some may still exist in this documentation.    Sandy Mclaughlin, APRN  7/31/2019

## 2019-08-07 ENCOUNTER — TELEPHONE (OUTPATIENT)
Dept: NEUROLOGY | Facility: CLINIC | Age: 29
End: 2019-08-07

## 2019-08-07 ENCOUNTER — OFFICE VISIT (OUTPATIENT)
Dept: NEUROSURGERY | Facility: CLINIC | Age: 29
End: 2019-08-07

## 2019-08-07 VITALS — BODY MASS INDEX: 35.33 KG/M2 | WEIGHT: 192 LBS | TEMPERATURE: 98.2 F | HEIGHT: 62 IN

## 2019-08-07 DIAGNOSIS — M51.36 DEGENERATIVE DISC DISEASE, LUMBAR: ICD-10-CM

## 2019-08-07 DIAGNOSIS — M51.36 BULGING LUMBAR DISC: ICD-10-CM

## 2019-08-07 DIAGNOSIS — M54.9 MECHANICAL BACK PAIN: Primary | ICD-10-CM

## 2019-08-07 PROCEDURE — 99213 OFFICE O/P EST LOW 20 MIN: CPT | Performed by: PHYSICIAN ASSISTANT

## 2019-08-07 NOTE — PROGRESS NOTES
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  Additionally, she describes a tingling sensation to her bilateral lower extremities since  when she was diagnosed with Lyme disease.  Her low back complaints are pinpoint over the region where she had a lumbar puncture/blood patch.  Symptoms in her legs increase with standing >30 minutes, and improved with sitting.  She was recently diagnosed with fibromyalgia and started on Cymbalta-which has started to help mitigate symptom severity.  Since last seen in the office, she is attended 3 sessions of physical therapy -which reportedly aggravated her low back issues.  She continues to deny bowel or bladder dysfunction, neurogenic claudication-like symptoms, or overt weakness.  She has no other complaints at this time.      Review of Systems   Constitutional: Negative for activity change, appetite change, chills, diaphoresis, fatigue, fever and unexpected weight change.   HENT: Negative for congestion, dental problem, drooling, ear discharge, ear pain, facial swelling, hearing loss, mouth sores, nosebleeds, postnasal drip, rhinorrhea, sinus pressure, sinus pain, sneezing, sore throat, tinnitus, trouble swallowing and voice change.    Eyes: Negative for photophobia, pain, discharge, redness, itching and visual disturbance.   Respiratory: Negative for apnea, cough, choking, chest tightness, shortness of breath, wheezing and stridor.    Cardiovascular: Negative for chest pain, palpitations and leg swelling.   Gastrointestinal: Negative for abdominal distention, abdominal pain, anal  "bleeding, blood in stool, constipation, diarrhea, nausea, rectal pain and vomiting.   Endocrine: Negative for cold intolerance, heat intolerance, polydipsia, polyphagia and polyuria.   Genitourinary: Negative for decreased urine volume, difficulty urinating, dyspareunia, dysuria, enuresis, flank pain, frequency, genital sores, hematuria, menstrual problem, pelvic pain, urgency, vaginal bleeding, vaginal discharge and vaginal pain.   Musculoskeletal: Negative for arthralgias, back pain, gait problem, joint swelling, myalgias, neck pain and neck stiffness.   Skin: Negative for color change, pallor, rash and wound.   Allergic/Immunologic: Negative for environmental allergies, food allergies and immunocompromised state.   Neurological: Positive for weakness (BLE), numbness (BLE) and headaches. Negative for dizziness, tremors, seizures, syncope, facial asymmetry, speech difficulty and light-headedness.   Hematological: Negative for adenopathy. Does not bruise/bleed easily.   Psychiatric/Behavioral: Negative for agitation, behavioral problems, confusion, decreased concentration, dysphoric mood, hallucinations, self-injury, sleep disturbance and suicidal ideas. The patient is not nervous/anxious and is not hyperactive.        The patient's past medical history, past surgical history, family history, and social history have been reviewed at length in the electronic medical record.    Physical Exam:   Temp 98.2 °F (36.8 °C) (Temporal)   Ht 157.5 cm (62\")   Wt 87.1 kg (192 lb)   BMI 35.12 kg/m²      MUSCULOSKELETAL:  - Back tenderness to palpation is not observed.   - ROM in back is normal.  - Straight leg raise is negative bilaterally   - Jose Alfredo's sign is negative   NEUROLOGICAL:  - A&Ox3  - Attention span, language function, and cognition are intact.  - Strength is intact in the upper and lower extremities to direct testing.  - Muscle tone is normal throughout.  - Station and gait are normal.  - Sensation is intact to " light touch testing throughout.  - Deep tendon reflexes are 1+ and symmetrical.    - Joyce's Sign is negative bilaterally.  - No clonus is elicited.  - Coordination is intact.    Medical Decision Making    Data Review:   1. MRI Lumbar Spine (6/25/19): left-sided disc protrusion at L5-S1.  Otherwise no significant canal or root compromise.     Diagnosis/Treatment Options:  1. Mechanical back pain  2. Bulging lumbar disc  3. Degenerative disc disease, lumbar       Follow up:  Ms. Adam is seen today in follow-up with continued pinpoint low back pain, and bilateral leg sensory alteration despite time, conservative measures, and physical therapy.  Unfortunately, there is no surgical intervention warranted at this time.  We recommend that she continue with her Cymbalta.  She is cleared to return to work from a neurosurgical standpoint.  Patient will follow-up in our office on an as-needed basis.    Shilpa Lester PA-C   8/7/2019   2:06 PM

## 2019-08-07 NOTE — TELEPHONE ENCOUNTER
----- Message from Musa May sent at 8/7/2019  3:14 PM EDT -----  Regarding: Sandy - Release to Work  Contact: [phone]  Patient called to leave a message for Sandy. She would like a work release for 8/19/2019. She wanted to know if she needed to make a follow up appointment to see Sandy. Patient can be reached at [phone].

## 2019-08-07 NOTE — TELEPHONE ENCOUNTER
She normally has to be seen to have a release to work, however, I did review her neurosurgery note from today and they do give her clearance to return to work. I am happy to write a work excuse, but what is the reason for the date of 8/19/19? If it is not for this week or next week I do need to see her. Thanks. She is scheduled for 9/25/19 to f/u on her cymbalta and fibromyalgia.

## 2019-08-08 NOTE — TELEPHONE ENCOUNTER
Pt is wanting to wait until 8- because she was hoping the cymbalta would kick in by then and help with some of the pain.  She went on and scheduled another appt for 8- to discuss the work release note in detail.

## 2019-08-15 ENCOUNTER — OFFICE VISIT (OUTPATIENT)
Dept: NEUROLOGY | Facility: CLINIC | Age: 29
End: 2019-08-15

## 2019-08-15 VITALS
WEIGHT: 193 LBS | DIASTOLIC BLOOD PRESSURE: 82 MMHG | OXYGEN SATURATION: 99 % | BODY MASS INDEX: 35.51 KG/M2 | SYSTOLIC BLOOD PRESSURE: 118 MMHG | HEART RATE: 59 BPM | HEIGHT: 62 IN

## 2019-08-15 DIAGNOSIS — M51.36 DDD (DEGENERATIVE DISC DISEASE), LUMBAR: ICD-10-CM

## 2019-08-15 DIAGNOSIS — M79.7 FIBROMYALGIA: Primary | ICD-10-CM

## 2019-08-15 DIAGNOSIS — M54.59 MECHANICAL LOW BACK PAIN: ICD-10-CM

## 2019-08-15 PROCEDURE — 99213 OFFICE O/P EST LOW 20 MIN: CPT | Performed by: NURSE PRACTITIONER

## 2019-08-15 RX ORDER — ALBUTEROL SULFATE 90 UG/1
2 AEROSOL, METERED RESPIRATORY (INHALATION) AS NEEDED
COMMUNITY
End: 2020-11-09 | Stop reason: HOSPADM

## 2019-08-15 NOTE — PROGRESS NOTES
Subjective:     Patient ID: Alexia Adam is a 29 y.o. female.    CC:   Chief Complaint   Patient presents with   • Fibromyalgia       HPI:   History of Present Illness   This is a 30 y/o female who presents for 2 week follow up on acute LBP present about 9 weeks with no known injury as well as chronic pains following possible lyme disease in 2015.  Underwent MRI of the lumbar spine without contrast completed on 6/25/2019 showing a disc bulge with left-sided nerve root contact and she has been evaluated by neurosurgery Dr. Krueger on 7/3/2019 and he has diagnosed her with lumbar degenerative disc disease without an active radiculopathy as well as mechanical lower back pain. She completed PT. She followed up with Neurosurgery on 8/7/19 and no surgical intervention was recommended and she was given full clearance to return to work. She feels like her lower back pain is improving. She is here for a work note to return to work Monday 8/19/19.     We also diagnosed her with Fibromyalgia last visit and she has started Cymbalta 30mg daily and just over the past 2 weeks feels like her myalgias and generalized pains are improving. She reports no side effects. Recently saw Rheumatology again here locally and they did additional extensive lab work which was unremarkable and reviewing notes and they recommended she follow up with our office to treat her for Fibromyalgia. She was told by [facility] in 2015 she likely had this as well. Has chronic numbness, tingling, burning sensation x all 4 extremities. Desperate for relief and willing to try a different medication. Migraines are episodic and only every few months now.  She has previously tried Topamax, Zonisamide, Amitriptyline and Gabapentin for migraines and neuropathic pains and has not tolerated any of these medications due to unwanted side effects. Lyme disease diagnosis 12/2015.      Prior Extensive Workup: MRI brain with and without contrast 6/10/2016 which  was within normal limits with no abnormal enhancing lesions and also had MRI C spine with and without contrast showing C2-C3 and C3-C4 small disc osteophytes noted but no neuro foraminal narrowing or spinal stenosis noted and no abnormal enhancing lesions. She also had EMG/NCVS BLE 8/16/2016 which was WNL.  She also underwent a lumbar puncture on November 30, 2015 and the Lyme disease PCR was negative on CSF, glucose CV SF was 52, protein CSF normal at 25, cell count CSF normal, culture CSF negative, cryptococcus antigen negative, rheumatoid factor at that time normal.  CRP was normal.  CMP was within normal limits.  Sed rate was normal at 6 and CBC with differential was within normal limits.  She had been diagnosed with Lyme disease by her primary care provider about 1.5 months prior to her LP/ER workup at [facility].    The following portions of the patient's history were reviewed and updated as appropriate: allergies, current medications, past family history, past medical history, past social history, past surgical history and problem list.    Past Medical History:   Diagnosis Date   • Asthma    • Lyme disease, acute    • Pupillary abnormalities 01/14/2016    resolved       Past Surgical History:   Procedure Laterality Date   • TONSILECTOMY, ADENOIDECTOMY, BILATERAL MYRINGOTOMY AND TUBES         Social History     Socioeconomic History   • Marital status: Single     Spouse name: Not on file   • Number of children: Not on file   • Years of education: Not on file   • Highest education level: Not on file   Tobacco Use   • Smoking status: Never Smoker   • Smokeless tobacco: Never Used   Substance and Sexual Activity   • Alcohol use: No   • Drug use: No       Family History   Problem Relation Age of Onset   • Heart disease Mother         cardiac disorder   • Hypertension Mother    • Cancer Mother         Review of Systems   Constitutional: Negative for chills, fatigue, fever and unexpected weight change.   HENT: Negative  for ear pain, hearing loss, nosebleeds, rhinorrhea and sore throat.    Eyes: Negative for photophobia, pain, discharge, itching and visual disturbance.   Respiratory: Negative for cough, chest tightness, shortness of breath and wheezing.    Cardiovascular: Negative for chest pain, palpitations and leg swelling.   Gastrointestinal: Negative for abdominal pain, blood in stool, constipation, diarrhea, nausea and vomiting.   Genitourinary: Negative for dysuria, frequency, hematuria and urgency.   Musculoskeletal: Positive for back pain and myalgias. Negative for arthralgias, gait problem, joint swelling, neck pain and neck stiffness.   Skin: Negative for rash and wound.   Allergic/Immunologic: Negative for environmental allergies and food allergies.   Neurological: Negative for dizziness, tremors, seizures, syncope, speech difficulty, weakness, light-headedness, numbness and headaches.   Hematological: Negative for adenopathy. Does not bruise/bleed easily.   Psychiatric/Behavioral: Negative for agitation, confusion, decreased concentration, hallucinations, sleep disturbance and suicidal ideas. The patient is not nervous/anxious.         Objective:    Neurologic Exam  Mental Status   Oriented to person, place, and time.   Level of consciousness: alert     Cranial Nerves   Cranial nerves II through XII intact.      CN III, IV, VI   Extraocular motions are normal.   Pupils: unequal  Right pupil: Size: 4 (ovoid-chronic) mm. Shape: regular. Reactivity: brisk. Consensual response: intact.   Left pupil: Size: 4 (round) mm. Shape: regular. Reactivity: brisk. Consensual response: intact.      Motor Exam   Muscle bulk: normal  Overall muscle tone: normal     Strength   Strength 5/5 throughout.      Sensory Exam   Right leg light touch: normal  Left leg light touch: normal  Right leg vibration: normal  Left leg vibration: normal  Right leg proprioception: normal  Left leg proprioception: normal  Right leg pinprick: normal  Left  leg pinprick: normal     Gait, Coordination, and Reflexes     Gait: normal   Coordination   Finger to nose coordination: normal     Tremor   Resting tremor: absent  Intention tremor: absent  Action tremor: absent     Reflexes   Right brachioradialis: 2+  Left brachioradialis: 2+  Right biceps: 2+  Left biceps: 2+  Right triceps: 2+  Left triceps: 2+  Right patellar: 2+  Left patellar: 2+  Right achilles: 2+  Left achilles: 2+  Right : 2+  Left : 2+  Right plantar: normal  Left plantar: normal     Physical Exam  Constitutional: She is oriented to person, place, and time.   Neurological: She is oriented to person, place, and time. She has normal strength. She has a normal Finger-Nose-Finger Test.   Reflex Scores:       Tricep reflexes are 2+ on the right side and 2+ on the left side.       Bicep reflexes are 2+ on the right side and 2+ on the left side.       Brachioradialis reflexes are 2+ on the right side and 2+ on the left side.       Patellar reflexes are 2+ on the right side and 2+ on the left side.       Achilles reflexes are 2+ on the right side and 2+ on the left side.  Psychiatric: Her mood appears anxious    Assessment/Plan:       Alexia was seen today for fibromyalgia.    Diagnoses and all orders for this visit:    Fibromyalgia  Comments:  continue Cymbalta. Consider increasing in the future based on symptom relief.    DDD (degenerative disc disease), lumbar  Comments:  no surgical interventions. completed PT. Ensure good body mechanics.    Mechanical low back pain  Comments:  completed PT. Good body mechanics.         From a neurological standpoint she is cleared to return to work without restrictions on 8/19/19. She will be on Cymbalta for her Fibromyalgia a full 2 weeks by that point. We will f/u in 6 weeks for re-eval and to see if we need to adjust her medication. Encouraged her to continue home PT exercises and good body mechanics. Reviewed medications, potential side effects and signs and  symptoms to report. Discussed risk versus benefits of treatment plan with patient and/or family-including medications, labs and radiology that may be ordered. Addressed questions and concerns during visit. Patient and/or family verbalized understanding and agree with plan.    During this visit the following were done:  Labs Reviewed []    Labs Ordered []    Radiology Reports Reviewed []    Radiology Ordered []    PCP Records Reviewed []    Referring Provider Records Reviewed []    ER Records Reviewed []    Hospital Records Reviewed []    History Obtained From Family []    Radiology Images Reviewed []    Other Reviewed [x] neurosurgery notes   Records Requested []      EMR Dragon/Transcription Disclaimer:  Much of this encounter note is an electronic transcription of spoken language to printed text. Electronic transcription of spoken language may permit erroneous words or phrases to be inadvertently transcribed. Although I have reviewed the note for such errors, some may still exist in this documentation.      Sandy Mclaughlin, APRN  8/15/2019

## 2019-12-09 ENCOUNTER — OFFICE VISIT (OUTPATIENT)
Dept: NEUROLOGY | Facility: CLINIC | Age: 29
End: 2019-12-09

## 2019-12-09 VITALS
WEIGHT: 186 LBS | HEART RATE: 110 BPM | DIASTOLIC BLOOD PRESSURE: 80 MMHG | BODY MASS INDEX: 34.23 KG/M2 | SYSTOLIC BLOOD PRESSURE: 130 MMHG | HEIGHT: 62 IN | OXYGEN SATURATION: 97 %

## 2019-12-09 DIAGNOSIS — R25.1 TREMOR OF LEFT HAND: ICD-10-CM

## 2019-12-09 DIAGNOSIS — M79.7 FIBROMYALGIA: Primary | ICD-10-CM

## 2019-12-09 DIAGNOSIS — M51.36 DDD (DEGENERATIVE DISC DISEASE), LUMBAR: ICD-10-CM

## 2019-12-09 DIAGNOSIS — R25.3 FASCICULATIONS OF MUSCLE: ICD-10-CM

## 2019-12-09 DIAGNOSIS — M54.59 MECHANICAL LOW BACK PAIN: ICD-10-CM

## 2019-12-09 DIAGNOSIS — G43.009 MIGRAINE WITHOUT AURA AND WITHOUT STATUS MIGRAINOSUS, NOT INTRACTABLE: ICD-10-CM

## 2019-12-09 DIAGNOSIS — R29.898 LEFT HAND WEAKNESS: ICD-10-CM

## 2019-12-09 DIAGNOSIS — R25.2 CRAMP OF EXTREMITY: ICD-10-CM

## 2019-12-09 PROBLEM — G47.62 NOCTURNAL LEG CRAMPS: Status: ACTIVE | Noted: 2019-12-09

## 2019-12-09 PROCEDURE — 99214 OFFICE O/P EST MOD 30 MIN: CPT | Performed by: NURSE PRACTITIONER

## 2019-12-09 RX ORDER — DULOXETIN HYDROCHLORIDE 30 MG/1
30 CAPSULE, DELAYED RELEASE ORAL DAILY
Qty: 30 CAPSULE | Refills: 5 | Status: SHIPPED | OUTPATIENT
Start: 2019-12-09 | End: 2020-09-15

## 2019-12-09 RX ORDER — OXCARBAZEPINE 300 MG/1
TABLET, FILM COATED ORAL
Qty: 60 TABLET | Refills: 5 | Status: SHIPPED | OUTPATIENT
Start: 2019-12-09 | End: 2020-09-15

## 2019-12-09 NOTE — PROGRESS NOTES
Subjective:     Patient ID: Alexia Adam is a 29 y.o. female.    CC:   Chief Complaint   Patient presents with   • Fibromyalgia       HPI:   History of Present Illness   This is a 28 y/o female who presents for 3 month follow up on chronic pains following possible lyme disease in 2015.      She is concerned because she is having a trembling in both of her hands but more so in the left hand. This was present intermittently over past few months but now daily and persistent. This is at rest as well as with movement.  She also tells me she is having severe leg spasms and drawing up of bilateral thighs especially in the evening but can be throughout the day.  She has been taking off work about 4 to 6 days/month due to just feeling bad and weak.  She feels like her left hand is weakening some.  She has not had any recent imaging or labs but did have extensive work-up in 2016 which was essentially unremarkable.  She is not sure what would be causing her symptoms.  She would like a new nerve and muscle study.  Hers was normal in August 2016.  She denies any weakness in her face, denies facial paresthesias, denies unilateral weakness except for in the left hand.  She denies any severe neck pain.  She denies trouble with swallowing or speaking.  She does generally does not feel well.    Just finalized adoption of her nephew (sister's son) who is now her son last Tuesday. He is one.    She was having a lot of lower back pain last visit and was cleared by neurosurgery. Underwent MRI of the lumbar spine without contrast completed on 6/25/2019 showing a disc bulge with left-sided nerve root contact and she has been evaluated by neurosurgery Dr. Krueger on 7/3/2019 and he has diagnosed her with lumbar degenerative disc disease without an active radiculopathy as well as mechanical lower back pain. She completed PT. She followed up with Neurosurgery on 8/7/19 and no surgical intervention was recommended and she was given full  clearance to return to work. She feels like her lower back pain has improved.      We also diagnosed her with Fibromyalgia this year 2019 and she has been on Cymbalta 30mg daily and this has improved her myalgias and generalized pains. She reports no side effects. Recently saw Rheumatology again here locally Summer 2019 and they did additional extensive lab work which was unremarkable and they recommended treatment here for Fibromyalgia. She was told by [facility] in 2015 she likely had this as well.     Has chronic numbness, tingling, burning sensation x all 4 extremities. Desperate for relief and willing to try a different medication.     Migraines are episodic and only every few months now.  She has previously tried Topamax, Zonisamide, Amitriptyline and Gabapentin for migraines and neuropathic pains and has not tolerated any of these medications due to unwanted side effects. Lyme disease diagnosis 12/2015.      Prior Extensive Workup: MRI brain with and without contrast 6/10/2016 which was within normal limits with no abnormal enhancing lesions and also had MRI C spine with and without contrast showing C2-C3 and C3-C4 small disc osteophytes noted but no neuro foraminal narrowing or spinal stenosis noted and no abnormal enhancing lesions. She also had EMG/NCVS BLE 8/16/2016 which was WNL.  She also underwent a lumbar puncture on November 30, 2015 and the Lyme disease PCR was negative on CSF, glucose CV SF was 52, protein CSF normal at 25, cell count CSF normal, culture CSF negative, cryptococcus antigen negative, rheumatoid factor at that time normal.  CRP was normal.  CMP was within normal limits.  Sed rate was normal at 6 and CBC with differential was within normal limits.  She had been diagnosed with Lyme disease by her primary care provider about 1.5 months prior to her LP/ER workup at [facility].    The following portions of the patient's history were reviewed and updated as appropriate: allergies,  current medications, past family history, past medical history, past social history, past surgical history and problem list.    Past Medical History:   Diagnosis Date   • Asthma    • Lyme disease, acute    • Pupillary abnormalities 01/14/2016    resolved       Past Surgical History:   Procedure Laterality Date   • TONSILECTOMY, ADENOIDECTOMY, BILATERAL MYRINGOTOMY AND TUBES         Social History     Socioeconomic History   • Marital status: Single     Spouse name: Not on file   • Number of children: Not on file   • Years of education: Not on file   • Highest education level: Not on file   Tobacco Use   • Smoking status: Never Smoker   • Smokeless tobacco: Never Used   Substance and Sexual Activity   • Alcohol use: No   • Drug use: No   • Sexual activity: Yes       Family History   Problem Relation Age of Onset   • Heart disease Mother         cardiac disorder   • Hypertension Mother    • Cancer Mother         Review of Systems   Constitutional: Negative for chills, fatigue, fever and unexpected weight change.   HENT: Negative for ear pain, hearing loss, nosebleeds, rhinorrhea and sore throat.    Eyes: Negative for photophobia, pain, discharge, itching and visual disturbance.   Respiratory: Negative for cough, chest tightness, shortness of breath and wheezing.    Cardiovascular: Negative for chest pain, palpitations and leg swelling.   Gastrointestinal: Negative for abdominal pain, blood in stool, constipation, diarrhea, nausea and vomiting.   Genitourinary: Negative for dysuria, frequency, hematuria and urgency.   Musculoskeletal: Negative for arthralgias, back pain, gait problem, joint swelling, myalgias, neck pain and neck stiffness.   Skin: Negative for rash and wound.   Allergic/Immunologic: Negative for environmental allergies and food allergies.   Neurological: Positive for numbness. Negative for dizziness, tremors, seizures, syncope, speech difficulty, weakness, light-headedness and headaches.        In both  "legs   Hematological: Negative for adenopathy. Does not bruise/bleed easily.   Psychiatric/Behavioral: Negative for agitation, confusion, decreased concentration, hallucinations, sleep disturbance and suicidal ideas. The patient is not nervous/anxious.    All other systems reviewed and are negative.       Objective:  /80   Pulse 110   Ht 157.5 cm (62\")   Wt 84.4 kg (186 lb)   SpO2 97%   BMI 34.02 kg/m²     Neurologic Exam     Mental Status   Oriented to person, place, and time.   Speech: speech is normal   Level of consciousness: alert    Cranial Nerves     CN II   Visual fields full to confrontation.     CN III, IV, VI   Right pupil: Size: 4 mm. Shape: irregular (ovoid-chronic). Reactivity: brisk. Consensual response: intact.   Left pupil: Size: 4 mm. Shape: regular. Reactivity: brisk. Consensual response: intact.     CN V   Facial sensation intact.     CN VII   Facial expression full, symmetric.     CN VIII   CN VIII normal.     CN IX, X   CN IX normal.   CN X normal.     CN XI   CN XI normal.     CN XII   Tongue: not atrophic  Fasciculations: absent  Tongue deviation: none    Motor Exam   Muscle bulk: normal  Overall muscle tone: normal    Strength   Strength 5/5 except as noted.   Left strength: Left hand fasciculations at rest. Not distractible. Some tremors left hand at rest. Mild atrophy left hand compared to right-new.    Sensory Exam   Light touch normal.   Vibration normal.   Proprioception normal.   Pinprick normal.     Gait, Coordination, and Reflexes     Gait  Gait: normal    Coordination   Romberg: negative  Finger to nose coordination: normal  Heel to shin coordination: normal  Tandem walking coordination: normal    Tremor   Resting tremor: present (left hand resting tremors)  Intention tremor: present (BUE kinetic hand tremors, none in legs visible today)  Action tremor: absent    Reflexes   Right brachioradialis: 3+  Left brachioradialis: 3+  Right biceps: 3+  Left biceps: 3+  Right " triceps: 3+  Left triceps: 3+  Right patellar: 3+  Left patellar: 3+  Right achilles: 3+  Left achilles: 3+  Right : 2+  Left : 1+      Physical Exam   Constitutional: She is oriented to person, place, and time.   Neurological: She is oriented to person, place, and time. She has a normal Finger-Nose-Finger Test, a normal Heel to Shin Test, a normal Romberg Test and a normal Tandem Gait Test. Gait normal.   Reflex Scores:       Tricep reflexes are 3+ on the right side and 3+ on the left side.       Bicep reflexes are 3+ on the right side and 3+ on the left side.       Brachioradialis reflexes are 3+ on the right side and 3+ on the left side.       Patellar reflexes are 3+ on the right side and 3+ on the left side.       Achilles reflexes are 3+ on the right side and 3+ on the left side.  Psychiatric: Her speech is normal and behavior is normal. Judgment and thought content normal. Her mood appears anxious. Cognition and memory are normal.       Assessment/Plan:       Alexia was seen today for fibromyalgia.    Diagnoses and all orders for this visit:    Fibromyalgia  -     DULoxetine (CYMBALTA) 30 MG capsule; Take 1 capsule by mouth Daily.    Cramp of extremity  -     OXcarbazepine (TRILEPTAL) 300 MG tablet; 1/2 tablet twice a day for 2 weeks then 1 tablet twice a day and continue  -     EEG Awake or Drowsy Routine; Future    Fasciculations of muscle  -     EMG & Nerve Conduction Test; Future  -     MRI Brain With & Without Contrast; Future  -     MRI Cervical Spine With & Without Contrast; Future  -     ADRIEL by IFA, Reflex 9-biomarkers profile; Future  -     CK Total & CKMB; Future  -     Comprehensive Metabolic Panel; Future  -     Vitamin B12 & Folate; Future  -     Thyroid Panel With TSH; Future  -     Sedimentation Rate; Future  -     C-reactive Protein; Future  -     CBC & Differential; Future  -     Rheumatoid Factor; Future  -     Methylmalonic Acid, Serum; Future  -     Copper, Serum; Future  -      Ceruloplasmin; Future  -     EEG Awake or Drowsy Routine; Future    Left hand weakness  -     EMG & Nerve Conduction Test; Future  -     MRI Brain With & Without Contrast; Future  -     MRI Cervical Spine With & Without Contrast; Future  -     ADRIEL by IFA, Reflex 9-biomarkers profile; Future  -     CK Total & CKMB; Future  -     Comprehensive Metabolic Panel; Future  -     Vitamin B12 & Folate; Future  -     Thyroid Panel With TSH; Future  -     Sedimentation Rate; Future  -     C-reactive Protein; Future  -     CBC & Differential; Future  -     Rheumatoid Factor; Future  -     Methylmalonic Acid, Serum; Future  -     Copper, Serum; Future  -     Ceruloplasmin; Future    Tremor of left hand  -     Copper, Serum; Future  -     Ceruloplasmin; Future    DDD (degenerative disc disease), lumbar  Comments:  continue home PT exercises    Mechanical low back pain  Comments:  continue home PT exercises    Migraine without aura and without status migrainosus, not intractable  Comments:  well controlled. tylenol prn.           She does seem to have progression of symptoms.  I ordered a nerve and muscle study for further evaluation.  These could just be benign fasciculations and tremors.  I have also ordered an MRI of the cervical and brain with and without contrast to rule out demyelinating lesions or other etiology of symptoms.  Prior imaging for was from 2016 and unremarkable but  Progressive symptoms warrant new imaging.  Also ordered additional extensive labs to rule out any other etiology of symptoms.  She will follow-up in our office in 6 to 8 weeks.  I am going to put her on a low-dose oxcarbazepine to see if this will help with the severe cramping.  She will continue her Cymbalta for fibromyalgia. Reviewed medications, potential side effects and signs and symptoms to report. Discussed risk versus benefits of treatment plan with patient and/or family-including medications, labs and radiology that may be ordered.  Addressed questions and concerns during visit. Patient and/or family verbalized understanding and agree with plan. She is missing work about 4-6 days a month due to symptoms and would like FMLA completed. Will complete this when she drops it off.    During this visit the following were done:  Labs Reviewed [x]    Labs Ordered [x]    Radiology Reports Reviewed [x]    Radiology Ordered [x]    PCP Records Reviewed []    Referring Provider Records Reviewed []    ER Records Reviewed []    Hospital Records Reviewed []    History Obtained From Family []    Radiology Images Reviewed []    Other Reviewed []    Records Requested []      EMR Dragon/Transcription Disclaimer:  Much of this encounter note is an electronic transcription of spoken language to printed text. Electronic transcription of spoken language may permit erroneous words or phrases to be inadvertently transcribed. Although I have reviewed the note for such errors, some may still exist in this documentation.    Sandy Mclaughlin, APRN  12/9/2019

## 2019-12-13 ENCOUNTER — LAB (OUTPATIENT)
Dept: LAB | Facility: HOSPITAL | Age: 29
End: 2019-12-13

## 2019-12-13 DIAGNOSIS — R25.3 FASCICULATIONS OF MUSCLE: ICD-10-CM

## 2019-12-13 DIAGNOSIS — R25.1 TREMOR OF LEFT HAND: ICD-10-CM

## 2019-12-13 DIAGNOSIS — R29.898 LEFT HAND WEAKNESS: ICD-10-CM

## 2019-12-13 PROCEDURE — 82390 ASSAY OF CERULOPLASMIN: CPT

## 2019-12-13 PROCEDURE — 86140 C-REACTIVE PROTEIN: CPT

## 2019-12-13 PROCEDURE — 86431 RHEUMATOID FACTOR QUANT: CPT

## 2019-12-13 PROCEDURE — 36415 COLL VENOUS BLD VENIPUNCTURE: CPT

## 2019-12-13 PROCEDURE — 85025 COMPLETE CBC W/AUTO DIFF WBC: CPT

## 2019-12-13 PROCEDURE — 82607 VITAMIN B-12: CPT

## 2019-12-13 PROCEDURE — 86235 NUCLEAR ANTIGEN ANTIBODY: CPT

## 2019-12-13 PROCEDURE — 82550 ASSAY OF CK (CPK): CPT

## 2019-12-13 PROCEDURE — 84436 ASSAY OF TOTAL THYROXINE: CPT

## 2019-12-13 PROCEDURE — 85652 RBC SED RATE AUTOMATED: CPT

## 2019-12-13 PROCEDURE — 83921 ORGANIC ACID SINGLE QUANT: CPT

## 2019-12-13 PROCEDURE — 84479 ASSAY OF THYROID (T3 OR T4): CPT

## 2019-12-13 PROCEDURE — 86038 ANTINUCLEAR ANTIBODIES: CPT

## 2019-12-13 PROCEDURE — 82746 ASSAY OF FOLIC ACID SERUM: CPT

## 2019-12-13 PROCEDURE — 86225 DNA ANTIBODY NATIVE: CPT

## 2019-12-13 PROCEDURE — 82553 CREATINE MB FRACTION: CPT

## 2019-12-13 PROCEDURE — 82525 ASSAY OF COPPER: CPT

## 2019-12-13 PROCEDURE — 80053 COMPREHEN METABOLIC PANEL: CPT

## 2019-12-13 PROCEDURE — 84443 ASSAY THYROID STIM HORMONE: CPT

## 2019-12-14 LAB
ALBUMIN SERPL-MCNC: 4.6 G/DL (ref 3.5–5.2)
ALBUMIN/GLOB SERPL: 1.4 G/DL
ALP SERPL-CCNC: 44 U/L (ref 39–117)
ALT SERPL W P-5'-P-CCNC: 17 U/L (ref 1–33)
ANION GAP SERPL CALCULATED.3IONS-SCNC: 14 MMOL/L (ref 5–15)
AST SERPL-CCNC: 13 U/L (ref 1–32)
BASOPHILS # BLD AUTO: 0.03 10*3/MM3 (ref 0–0.2)
BASOPHILS NFR BLD AUTO: 0.4 % (ref 0–1.5)
BILIRUB SERPL-MCNC: 0.4 MG/DL (ref 0.2–1.2)
BUN BLD-MCNC: 10 MG/DL (ref 6–20)
BUN/CREAT SERPL: 11 (ref 7–25)
CALCIUM SPEC-SCNC: 9.6 MG/DL (ref 8.6–10.5)
CERULOPLASMIN SERPL-MCNC: 16 MG/DL (ref 19–39)
CHLORIDE SERPL-SCNC: 103 MMOL/L (ref 98–107)
CHROMATIN AB SERPL-ACNC: <10 IU/ML (ref 0–14)
CK MB SERPL-CCNC: <1 NG/ML
CK SERPL-CCNC: 49 U/L (ref 20–180)
CO2 SERPL-SCNC: 25 MMOL/L (ref 22–29)
CREAT BLD-MCNC: 0.91 MG/DL (ref 0.57–1)
CRP SERPL-MCNC: 0.04 MG/DL (ref 0–0.5)
DEPRECATED RDW RBC AUTO: 39.3 FL (ref 37–54)
EOSINOPHIL # BLD AUTO: 0.27 10*3/MM3 (ref 0–0.4)
EOSINOPHIL NFR BLD AUTO: 3.7 % (ref 0.3–6.2)
ERYTHROCYTE [DISTWIDTH] IN BLOOD BY AUTOMATED COUNT: 12 % (ref 12.3–15.4)
ERYTHROCYTE [SEDIMENTATION RATE] IN BLOOD: 7 MM/HR (ref 0–20)
FOLATE SERPL-MCNC: 10.1 NG/ML (ref 4.78–24.2)
GFR SERPL CREATININE-BSD FRML MDRD: 73 ML/MIN/1.73
GLOBULIN UR ELPH-MCNC: 3.3 GM/DL
GLUCOSE BLD-MCNC: 99 MG/DL (ref 65–99)
HCT VFR BLD AUTO: 40.6 % (ref 34–46.6)
HGB BLD-MCNC: 13.5 G/DL (ref 12–15.9)
IMM GRANULOCYTES # BLD AUTO: 0.02 10*3/MM3 (ref 0–0.05)
IMM GRANULOCYTES NFR BLD AUTO: 0.3 % (ref 0–0.5)
LYMPHOCYTES # BLD AUTO: 2.04 10*3/MM3 (ref 0.7–3.1)
LYMPHOCYTES NFR BLD AUTO: 27.7 % (ref 19.6–45.3)
MCH RBC QN AUTO: 29.7 PG (ref 26.6–33)
MCHC RBC AUTO-ENTMCNC: 33.3 G/DL (ref 31.5–35.7)
MCV RBC AUTO: 89.4 FL (ref 79–97)
MONOCYTES # BLD AUTO: 0.45 10*3/MM3 (ref 0.1–0.9)
MONOCYTES NFR BLD AUTO: 6.1 % (ref 5–12)
NEUTROPHILS # BLD AUTO: 4.56 10*3/MM3 (ref 1.7–7)
NEUTROPHILS NFR BLD AUTO: 61.8 % (ref 42.7–76)
NRBC BLD AUTO-RTO: 0 /100 WBC (ref 0–0.2)
PLATELET # BLD AUTO: 362 10*3/MM3 (ref 140–450)
PMV BLD AUTO: 10.1 FL (ref 6–12)
POTASSIUM BLD-SCNC: 4.1 MMOL/L (ref 3.5–5.2)
PROT SERPL-MCNC: 7.9 G/DL (ref 6–8.5)
RBC # BLD AUTO: 4.54 10*6/MM3 (ref 3.77–5.28)
SODIUM BLD-SCNC: 142 MMOL/L (ref 136–145)
T-UPTAKE NFR SERPL: 0.93 TBI (ref 0.8–1.3)
T4 SERPL-MCNC: 6.29 MCG/DL (ref 4.5–11.7)
TSH SERPL DL<=0.05 MIU/L-ACNC: 0.64 UIU/ML (ref 0.27–4.2)
VIT B12 BLD-MCNC: 457 PG/ML (ref 211–946)
WBC NRBC COR # BLD: 7.37 10*3/MM3 (ref 3.4–10.8)

## 2019-12-16 ENCOUNTER — TELEPHONE (OUTPATIENT)
Dept: NEUROLOGY | Facility: CLINIC | Age: 29
End: 2019-12-16

## 2019-12-16 NOTE — PROGRESS NOTES
So far all labs are normal except a low level of ceruloplasmin which has to do with your bodies absorption of copper. I am waiting on your copper level to see if this is elevated. If this is elevated, we may refer you to a GI specialist for further evaluation. Otherwise all other labs look excellent. We will keep you posted. Thanks, DIMITRI Maurice    Fax copy of labs to pcp

## 2019-12-16 NOTE — TELEPHONE ENCOUNTER
----- Message from DIMITRI George sent at 12/16/2019  9:04 AM EST -----  So far all labs are normal except a low level of ceruloplasmin which has to do with your bodies absorption of copper. I am waiting on your copper level to see if this is elevated. If this is elevated, we may refer you to a GI specialist for further evaluation. Otherwise all other labs look excellent. We will keep you posted. Thanks, DIMITRI Maurice    Fax copy of labs to pcp

## 2019-12-17 LAB — COPPER SERPL-MCNC: 71 UG/DL (ref 72–166)

## 2019-12-18 LAB
ANA SER QL IA: POSITIVE
CENTROMERE B AB SER-ACNC: <0.2 AI (ref 0–0.9)
CHROMATIN AB SERPL-ACNC: <0.2 AI (ref 0–0.9)
DSDNA AB SER-ACNC: 1 IU/ML (ref 0–9)
ENA JO1 AB SER-ACNC: <0.2 AI (ref 0–0.9)
ENA RNP AB SER-ACNC: <0.2 AI (ref 0–0.9)
ENA SCL70 AB SER-ACNC: <0.2 AI (ref 0–0.9)
ENA SM AB SER-ACNC: <0.2 AI (ref 0–0.9)
ENA SS-A AB SER-ACNC: <0.2 AI (ref 0–0.9)
ENA SS-B AB SER-ACNC: <0.2 AI (ref 0–0.9)
Lab: ABNORMAL
Lab: NORMAL
Lab: NORMAL
SPINDLE APPARATUS PATTERN: NORMAL

## 2019-12-19 DIAGNOSIS — E83.00 LOW CERULOPLASMIN LEVEL: ICD-10-CM

## 2019-12-19 DIAGNOSIS — R79.0 LOW SERUM COPPER FOR AGE: Primary | ICD-10-CM

## 2019-12-19 LAB
Lab: NORMAL
METHYLMALONATE SERPL-SCNC: 130 NMOL/L (ref 0–378)

## 2019-12-19 NOTE — PROGRESS NOTES
Autoimmune panel is minimally elevated but commonly comes back positive in health individuals. Previously this was positive and rheumatology evaluated her and diagnosed her with fibromyalgia. I would like to retest this in 3 months and if still positive we will refer her back to a specialist. Thanks, DIMITRI Maurice fax labs to pcp    For her low copper levels I would like to refer her to GI for further eval. Thanks.

## 2019-12-20 ENCOUNTER — TELEPHONE (OUTPATIENT)
Dept: NEUROLOGY | Facility: CLINIC | Age: 29
End: 2019-12-20

## 2019-12-20 NOTE — TELEPHONE ENCOUNTER
----- Message from DIMITRI George sent at 12/19/2019  3:45 PM EST -----  Autoimmune panel is minimally elevated but commonly comes back positive in health individuals. Previously this was positive and rheumatology evaluated her and diagnosed her with fibromyalgia. I would like to retest this in 3 months and if still positive we will refer her back to a specialist. Thanks, DIMITRI Maurice fax labs to pcp    For her low copper levels I would like to refer her to GI for further eval. Thanks.

## 2019-12-20 NOTE — TELEPHONE ENCOUNTER
PT IS AWARE OF THE RESULTS BELOW AND SHE WOULD LIKE TO SEE HER GI DOCTOR A DR. KIRAN SHE IS ALREADY ESTABLISHED THERE.

## 2020-01-31 ENCOUNTER — HOSPITAL ENCOUNTER (OUTPATIENT)
Dept: NEUROLOGY | Facility: HOSPITAL | Age: 30
Discharge: HOME | End: 2020-01-31

## 2020-01-31 ENCOUNTER — HOSPITAL ENCOUNTER (OUTPATIENT)
Dept: NEUROLOGY | Facility: HOSPITAL | Age: 30
Discharge: HOME | End: 2020-01-31
Admitting: NURSE PRACTITIONER

## 2020-01-31 DIAGNOSIS — R29.898 LEFT HAND WEAKNESS: ICD-10-CM

## 2020-01-31 DIAGNOSIS — R25.2 CRAMP OF EXTREMITY: ICD-10-CM

## 2020-01-31 DIAGNOSIS — R25.3 FASCICULATIONS OF MUSCLE: ICD-10-CM

## 2020-01-31 PROCEDURE — 95911 NRV CNDJ TEST 9-10 STUDIES: CPT

## 2020-01-31 PROCEDURE — 95816 EEG AWAKE AND DROWSY: CPT

## 2020-01-31 PROCEDURE — 95886 MUSC TEST DONE W/N TEST COMP: CPT

## 2020-02-04 ENCOUNTER — TELEPHONE (OUTPATIENT)
Dept: NEUROLOGY | Facility: CLINIC | Age: 30
End: 2020-02-04

## 2020-02-04 NOTE — PROGRESS NOTES
Please notify patient her EEG is normal showing no evidence for active seizures. We will f/u as scheduled in office. Please fax copy to pcp on file. Thanks, DIMITRI Maurice

## 2020-02-04 NOTE — TELEPHONE ENCOUNTER
----- Message from DIMITRI George sent at 2/4/2020  9:45 AM EST -----  Please notify patient her EEG is normal showing no evidence for active seizures. We will f/u as scheduled in office. Please fax copy to pcp on file. Thanks, DIMITRI Maurice

## 2020-02-04 NOTE — PROGRESS NOTES
Please notify patient her nerve and muscle study is completely normal. This is normal which is consistent with fibromyalgia which we have been treating since neuropathy and disc problems have been ruled out. We will follow up as scheduled in office. Thanks, DIMITRI Maurice please fax copy to pcp.

## 2020-02-12 ENCOUNTER — TELEPHONE (OUTPATIENT)
Dept: GASTROENTEROLOGY | Facility: CLINIC | Age: 30
End: 2020-02-12

## 2020-03-09 ENCOUNTER — LAB REQUISITION (OUTPATIENT)
Dept: LAB | Facility: HOSPITAL | Age: 30
End: 2020-03-09

## 2020-03-09 DIAGNOSIS — Z00.00 ROUTINE GENERAL MEDICAL EXAMINATION AT A HEALTH CARE FACILITY: ICD-10-CM

## 2020-03-09 PROCEDURE — 36415 COLL VENOUS BLD VENIPUNCTURE: CPT | Performed by: NURSE PRACTITIONER

## 2020-04-14 ENCOUNTER — LAB REQUISITION (OUTPATIENT)
Dept: LAB | Facility: HOSPITAL | Age: 30
End: 2020-04-14

## 2020-04-14 DIAGNOSIS — Z00.00 ROUTINE GENERAL MEDICAL EXAMINATION AT A HEALTH CARE FACILITY: ICD-10-CM

## 2020-04-14 PROCEDURE — 36415 COLL VENOUS BLD VENIPUNCTURE: CPT | Performed by: OBSTETRICS & GYNECOLOGY

## 2020-05-13 ENCOUNTER — TELEPHONE (OUTPATIENT)
Dept: GASTROENTEROLOGY | Facility: CLINIC | Age: 30
End: 2020-05-13

## 2020-08-18 ENCOUNTER — LAB REQUISITION (OUTPATIENT)
Dept: LAB | Facility: HOSPITAL | Age: 30
End: 2020-08-18

## 2020-08-18 DIAGNOSIS — Z00.00 ROUTINE GENERAL MEDICAL EXAMINATION AT A HEALTH CARE FACILITY: ICD-10-CM

## 2020-08-18 PROCEDURE — 36415 COLL VENOUS BLD VENIPUNCTURE: CPT | Performed by: OBSTETRICS & GYNECOLOGY

## 2020-08-25 ENCOUNTER — LAB REQUISITION (OUTPATIENT)
Dept: LAB | Facility: HOSPITAL | Age: 30
End: 2020-08-25

## 2020-08-25 DIAGNOSIS — Z00.00 ROUTINE GENERAL MEDICAL EXAMINATION AT A HEALTH CARE FACILITY: ICD-10-CM

## 2020-08-25 PROCEDURE — 36415 COLL VENOUS BLD VENIPUNCTURE: CPT | Performed by: OBSTETRICS & GYNECOLOGY

## 2020-09-15 ENCOUNTER — ROUTINE PRENATAL (OUTPATIENT)
Dept: OBSTETRICS AND GYNECOLOGY | Facility: CLINIC | Age: 30
End: 2020-09-15

## 2020-09-15 VITALS — BODY MASS INDEX: 38.59 KG/M2 | SYSTOLIC BLOOD PRESSURE: 118 MMHG | WEIGHT: 211 LBS | DIASTOLIC BLOOD PRESSURE: 80 MMHG

## 2020-09-15 DIAGNOSIS — A69.20 LYME DISEASE: ICD-10-CM

## 2020-09-15 DIAGNOSIS — Z3A.31 31 WEEKS GESTATION OF PREGNANCY: Primary | ICD-10-CM

## 2020-09-15 LAB
GLUCOSE UR STRIP-MCNC: ABNORMAL MG/DL
PROT UR STRIP-MCNC: NEGATIVE MG/DL

## 2020-09-15 PROCEDURE — 99213 OFFICE O/P EST LOW 20 MIN: CPT | Performed by: OBSTETRICS & GYNECOLOGY

## 2020-09-15 RX ORDER — D-METHORPHAN/PE/ACETAMINOPHEN 10-5-325MG
CAPSULE ORAL
COMMUNITY
End: 2020-11-09 | Stop reason: HOSPADM

## 2020-09-15 NOTE — PROGRESS NOTES
OB FOLLOW UP  CC- Here for care of pregnancy        Alexia Adam is a 30 y.o.  31w3d patient being seen today for her obstetrical follow up visit. Patient reports no bleeding, no contractions and no cramping. Last night she noticed a large amount of fluid that was not urine and she denies any odor or color.      Her prenatal care is complicated by (and status) :    Patient Active Problem List   Diagnosis   • Headache disorder   • Sequelae of infectious or parasitic diseases   • Paresthesia   • Arthralgia of multiple joints   • Myalgia   • Pupillary abnormalities   • Migraine without aura and without status migrainosus, not intractable   • Neck pain   • Joint swelling   • Fibromyalgia   • DDD (degenerative disc disease), lumbar   • Mechanical low back pain   • Nocturnal leg cramps       The additional following portions of the patient's history were reviewed and updated as appropriate: allergies, current medications, past family history, past medical history, past social history, past surgical history and problem list.    ROS -   Patient Reports : Loss of Fluid  Patient Denies: Vaginal Spotting, Vision Changes, Headaches and Cramping/Contractions   Fetal Movement : normal    I have reviewed and agree with the HPI, ROS, and historical information as entered above. Ector Apple MD    /80   Wt 95.7 kg (211 lb)   LMP 2020   BMI 38.59 kg/m²     Ultrasound Today: no    EXAM:   Prenatal Vitals  BP: 118/80  Weight: 95.7 kg (211 lb)    Prenatal Assessment  Movement: Present                  Assessment and Plan    Problem List Items Addressed This Visit     None      Visit Diagnoses     31 weeks gestation of pregnancy    -  Primary    Relevant Orders    POC Urinalysis Dipstick (Completed)    US Ob Follow Up Transabdominal Approach    Lyme disease        Relevant Orders    US Ob Follow Up Transabdominal Approach          1. Pregnancy at 31w3d  2. Fetal status reassuring.   3. Activity and  Exercise discussed.  4. Ultrasound in 2 weeks history of Lyme disease    Ector Apple MD  09/15/2020

## 2020-09-26 ENCOUNTER — HOSPITAL ENCOUNTER (OUTPATIENT)
Facility: HOSPITAL | Age: 30
Discharge: HOME | End: 2020-09-26
Attending: OBSTETRICS & GYNECOLOGY | Admitting: OBSTETRICS & GYNECOLOGY

## 2020-09-26 PROBLEM — Z34.90 PREGNANCY: Status: ACTIVE | Noted: 2020-09-26

## 2020-09-26 PROCEDURE — G0378 HOSPITAL OBSERVATION PER HR: HCPCS

## 2020-09-26 PROCEDURE — G0463 HOSPITAL OUTPT CLINIC VISIT: HCPCS

## 2020-09-26 PROCEDURE — 99214 OFFICE O/P EST MOD 30 MIN: CPT | Performed by: OBSTETRICS & GYNECOLOGY

## 2020-09-26 PROCEDURE — 59025 FETAL NON-STRESS TEST: CPT

## 2020-09-26 PROCEDURE — 59025 FETAL NON-STRESS TEST: CPT | Performed by: OBSTETRICS & GYNECOLOGY

## 2020-09-26 NOTE — H&P
[facility]  Obstetric History and Physical    Referring Provider: Dr. Dk Apple      CC: Mucousy discharge    Subjective     Patient is a 30 y.o. female  currently at 33w0d, who presents with C/O mucousy discharge.  Patient presents today with increase mucousy discharge today without any associated leaking of fluid or vaginal bleeding.  She reports normal fetal activity.  She denies shortness of breath, chest pain, recent trauma, urinary symptoms, loss of taste or smell.  Patient was seen in [place] labor and delivery yesterday for thought to be left sciatic pain.  She was noted to have uterine activity and was given to doses of subcu terbutaline.  She was given  instructions and discharged home.  Prenatal care by Dr. Apple complicated by history of chronic Lyme disease without any evidence of fetal infection or recent flareup.        The following portions of the patients history were reviewed and updated as appropriate: current medications, allergies, past medical history, past surgical history, past family history, past social history and problem list .       Prenatal Information:   Maternal Prenatal Labs  Blood Type No results found for: ABO   Rh Status No results found for: RH   Antibody Screen No results found for: ABSCRN   Gonnorhea No results found for: GCCX   Chlamydia No results found for: CLAMYDCU   RPR No results found for: RPR   Syphilis Antibody No results found for: SYPHILIS   Rubella No results found for: RUBELLAIGGIN   Hepatitis B Surface Antigen No results found for: HEPBSAG   HIV-1 Antibody No results found for: LABHIV1   Hepatitis C Antibody No results found for: HEPCAB   Rapid Urin Drug Screen No results found for: AMPMETHU, BARBITSCNUR, LABBENZSCN, LABMETHSCN, LABOPIASCN, THCURSCR, COCAINEUR, AMPHETSCREEN, PROPOXSCN, BUPRENORSCNU, METAMPSCNUR, OXYCODONESCN, TRICYCLICSCN   Group B Strep Culture No results found for: GBSANTIGEN           External Prenatal Results     Pregnancy  Outside Results - Transcribed From Office Records - See Scanned Records For Details     Test Value Date Time    Hgb 13.5 g/dL 19 1357    Hct 40.6 % 19 1357    ABO       Rh       Antibody Screen       Glucose Fasting GTT       Glucose Tolerance Test 1 hour       Glucose Tolerance Test 3 hour       Gonorrhea (discrete)       Chlamydia (discrete)       RPR       VDRL       Syphilis Antibody       Rubella       HBsAg       Herpes Simplex Virus PCR       Herpes Simplex VIrus Culture       HIV       Hep C RNA Quant PCR       Hep C Antibody       AFP       Group B Strep       GBS Susceptibility to Clindamycin       GBS Susceptibility to Erythromycin       Fetal Fibronectin       Genetic Testing, Maternal Blood             Drug Screening     Test Value Date Time    Urine Drug Screen       Amphetamine Screen       Barbiturate Screen       Benzodiazepine Screen       Methadone Screen       Phencyclidine Screen       Opiates Screen       THC Screen       Cocaine Screen       Propoxyphene Screen       Buprenorphine Screen       Methamphetamine Screen       Oxycodone Screen       Tricyclic Antidepressants Screen                     Past OB History:       OB History    Para Term  AB Living   1 0 0 0 0 0   SAB TAB Ectopic Molar Multiple Live Births   0 0 0 0 0 0      # Outcome Date GA Lbr Rodrigo/2nd Weight Sex Delivery Anes PTL Lv   1 Current                Past Medical History: Past Medical History:   Diagnosis Date   • Asthma    • Lyme disease, acute     Murray Wei but sees neurologist, endocrinology and gastro   • Migraine    • Pupillary abnormalities 2016    resolved      Past Surgical History Past Surgical History:   Procedure Laterality Date   • COLONOSCOPY      polyps-normal   • ENDOSCOPY     • TONSILECTOMY, ADENOIDECTOMY, BILATERAL MYRINGOTOMY AND TUBES     • TONSILLECTOMY        Family History: Family History   Problem Relation Age of Onset   • Heart disease Mother         cardiac  disorder   • Hypertension Mother    • Cancer Mother    • Brain cancer Maternal Grandmother    • Colon cancer Maternal Grandmother    • Uterine cancer Maternal Grandmother    • Uterine cancer Other    • Breast cancer Other       Social History:  reports that she has never smoked. She has never used smokeless tobacco.   reports no history of alcohol use.   reports no history of drug use.                   General ROS Negative Findings:Headaches, Visual Changes, Epigastric pain, Anorexia, Nausia/Vomiting, ROM and Vaginal Bleeding    ROS     All other systems have been reviewed and are neg  Objective       Vital Signs Range for the last 24 hours  Temperature:     Temp Source:     BP:     Pulse:     Respirations:     SPO2:     O2 Amount (l/min):     O2 Devices     Weight:       Physical Examination:   General:   alert, appears stated age and cooperative   Skin:   normal   HEENT:  Sclera clear   Lungs:   clear to auscultation bilaterally   Heart:   regular rate and rhythm, S1, S2 normal, no murmur, click, rub or gallop   Abdomen:  Soft, gravid uterus, no guarding, rebound benign exam   Lower Extremities  no edema, no calf tenderness   Pelvis:  External genitalia: normal general appearance  Uterus: enlarged     Neuro: No focal deficits noted DTR 2+4 no clonus    Presentation:  Vertex   Cervix: Exam by: Method: sterile exam per physician(Travis)   Dilation:  Closed   Effacement: Cervical Effacement: other (see comments)(thick)   Station:  Engaged  No blood noted on exam       Fetal Heart Rate Assessment   Method:     Beats/min:     Baseline:     Varibility:     Accels:     Decels:     Tracing Category:     NST-indications possible  labor, interpretation reactive, moderate variability, accelerations present 15 x 15, no decelerations noted, onset 1130, end time 1202, uterine irritability noted.  Uterine Assessment   Method:     Frequency (min):     Ctx Count in 10 min:     Duration:     Intensity:     Intensity by  IUPC:     Resting Tone:     Resting Tone by IUPC:     [place] Units:       Laboratory Results:   Lab Results (last 24 hours)     ** No results found for the last 24 hours. **        Radiology Review:   Imaging Results (Last 24 Hours)     ** No results found for the last 24 hours. **        Other Studies:    Assessment/Plan       Pregnancy        Assessment:  1.  Intrauterine pregnancy at 33w0d weeks gestation with reactive fetal status.    2.  False labor  3.  History of Lyme disease no current flareup  4.      Plan:  1.  Discharged home, kick count,  labor structures given, follow-up with OB provider routinely..  2. Plan of care has been reviewed with patient.  3.  Risks, benefits of treatment plan have been discussed.  4.  All questions have been answered.  5      Jimbo Robles DO  2020  12:08 EDT

## 2020-09-29 ENCOUNTER — ROUTINE PRENATAL (OUTPATIENT)
Dept: OBSTETRICS AND GYNECOLOGY | Facility: CLINIC | Age: 30
End: 2020-09-29

## 2020-09-29 VITALS — SYSTOLIC BLOOD PRESSURE: 140 MMHG | DIASTOLIC BLOOD PRESSURE: 88 MMHG | WEIGHT: 211 LBS | BODY MASS INDEX: 38.59 KG/M2

## 2020-09-29 DIAGNOSIS — A69.20 LYME DISEASE: ICD-10-CM

## 2020-09-29 DIAGNOSIS — Z3A.31 31 WEEKS GESTATION OF PREGNANCY: Primary | ICD-10-CM

## 2020-09-29 LAB
GLUCOSE UR STRIP-MCNC: ABNORMAL MG/DL
PROT UR STRIP-MCNC: NEGATIVE MG/DL

## 2020-09-29 PROCEDURE — 76816 OB US FOLLOW-UP PER FETUS: CPT | Performed by: OBSTETRICS & GYNECOLOGY

## 2020-09-29 PROCEDURE — 99212 OFFICE O/P EST SF 10 MIN: CPT | Performed by: OBSTETRICS & GYNECOLOGY

## 2020-09-29 NOTE — PROGRESS NOTES
"OB FOLLOW UP    Alexia Adam is a 30 y.o.  33w3d patient being seen today for her obstetrical follow up visit. Patient reports  pt was seen at [facility] on 19 for \"severe\" pain in her Rt. upper thigh area.  Pt thought her phone has burned her leg.  So at  she was having ctxs and 2 injections of turb, she was dialated 1.  She then went to  on 2020 and the DrNinfa said she was having normal ctxs and she was not dialated.  She then lost her mucus plug. .     Her prenatal care is complicated by (and status) : Threatened premature labor;  Seen at [facility], cervix closed, abnormal false labor  ROS -   Sciatic pain on the left and contractions regular   Fetal Movement : Good fetal movement      Current Outpatient Medications:   •  acetaminophen (TYLENOL) 325 MG tablet, Take 650 mg by mouth As Needed., Disp: , Rfl:   •  albuterol sulfate  (90 Base) MCG/ACT inhaler, Inhale 2 puffs As Needed., Disp: , Rfl:   •  diphenhydrAMINE (BENADRYL) 25 MG tablet, Take 25 mg by mouth As Needed., Disp: , Rfl:   •  Pediatric Multiple Vit-C-FA (Flinstones Gummies Omega-3 DHA) chewable tablet, Chew., Disp: , Rfl:     /88   Wt 95.7 kg (211 lb)   LMP 2020   BMI 38.59 kg/m²     FHT:  140 BPM    Uterine Size: size equals dates   Presentations: cephalic Checked : yes         Assessment    1. Pregnancy at 33w3d  2. Fetal status reassuring     Problem List Items Addressed This Visit        Other    Pregnancy - Primary    Relevant Orders    POC Urinalysis Dipstick (Completed)      Other Visit Diagnoses     Lyme disease              Plan    1. P/patient return in 2 weeks weeks  2. Prenatal teaching done and patient questions were answered  3. Ultrasound today shows appropriate growth   cx 0-50/-3    Ector Apple MD  2020  "

## 2020-10-06 ENCOUNTER — HOSPITAL ENCOUNTER (OUTPATIENT)
Facility: HOSPITAL | Age: 30
Setting detail: OBSERVATION
Discharge: HOME | End: 2020-10-06
Attending: OBSTETRICS & GYNECOLOGY | Admitting: OBSTETRICS & GYNECOLOGY

## 2020-10-06 ENCOUNTER — TELEPHONE (OUTPATIENT)
Dept: OBSTETRICS AND GYNECOLOGY | Facility: CLINIC | Age: 30
End: 2020-10-06

## 2020-10-06 VITALS
BODY MASS INDEX: 38.83 KG/M2 | WEIGHT: 211 LBS | SYSTOLIC BLOOD PRESSURE: 138 MMHG | HEART RATE: 98 BPM | RESPIRATION RATE: 16 BRPM | HEIGHT: 62 IN | DIASTOLIC BLOOD PRESSURE: 85 MMHG | TEMPERATURE: 98.3 F

## 2020-10-06 LAB
ALBUMIN SERPL-MCNC: 3.8 G/DL (ref 3.5–5.2)
ALBUMIN/GLOB SERPL: 1.3 G/DL
ALP SERPL-CCNC: 178 U/L (ref 39–117)
ALT SERPL W P-5'-P-CCNC: 14 U/L (ref 1–33)
ANION GAP SERPL CALCULATED.3IONS-SCNC: 13 MMOL/L (ref 5–15)
AST SERPL-CCNC: 27 U/L (ref 1–32)
BILIRUB SERPL-MCNC: 0.3 MG/DL (ref 0–1.2)
BILIRUB UR QL STRIP: NEGATIVE
BUN SERPL-MCNC: 7 MG/DL (ref 6–20)
BUN/CREAT SERPL: 11.7 (ref 7–25)
CALCIUM SPEC-SCNC: 9.3 MG/DL (ref 8.6–10.5)
CHLORIDE SERPL-SCNC: 104 MMOL/L (ref 98–107)
CLARITY UR: CLEAR
CO2 SERPL-SCNC: 19 MMOL/L (ref 22–29)
COLOR UR: YELLOW
CREAT SERPL-MCNC: 0.6 MG/DL (ref 0.57–1)
DEPRECATED RDW RBC AUTO: 41.1 FL (ref 37–54)
ERYTHROCYTE [DISTWIDTH] IN BLOOD BY AUTOMATED COUNT: 12.8 % (ref 12.3–15.4)
GFR SERPL CREATININE-BSD FRML MDRD: 117 ML/MIN/1.73
GLOBULIN UR ELPH-MCNC: 3 GM/DL
GLUCOSE SERPL-MCNC: 98 MG/DL (ref 65–99)
GLUCOSE UR STRIP-MCNC: NEGATIVE MG/DL
HCT VFR BLD AUTO: 37.8 % (ref 34–46.6)
HGB BLD-MCNC: 12.3 G/DL (ref 12–15.9)
HGB UR QL STRIP.AUTO: NEGATIVE
KETONES UR QL STRIP: NEGATIVE
LEUKOCYTE ESTERASE UR QL STRIP.AUTO: NEGATIVE
MCH RBC QN AUTO: 28.9 PG (ref 26.6–33)
MCHC RBC AUTO-ENTMCNC: 32.5 G/DL (ref 31.5–35.7)
MCV RBC AUTO: 88.7 FL (ref 79–97)
NITRITE UR QL STRIP: NEGATIVE
PH UR STRIP.AUTO: 6 [PH] (ref 5–8)
PLATELET # BLD AUTO: 282 10*3/MM3 (ref 140–450)
PMV BLD AUTO: 9.7 FL (ref 6–12)
POTASSIUM SERPL-SCNC: 3.8 MMOL/L (ref 3.5–5.2)
PROT SERPL-MCNC: 6.8 G/DL (ref 6–8.5)
PROT UR QL STRIP: NEGATIVE
RBC # BLD AUTO: 4.26 10*6/MM3 (ref 3.77–5.28)
SODIUM SERPL-SCNC: 136 MMOL/L (ref 136–145)
SP GR UR STRIP: 1.02 (ref 1–1.03)
UROBILINOGEN UR QL STRIP: NORMAL
WBC # BLD AUTO: 11.17 10*3/MM3 (ref 3.4–10.8)

## 2020-10-06 PROCEDURE — 59025 FETAL NON-STRESS TEST: CPT

## 2020-10-06 PROCEDURE — 99218 PR INITIAL OBSERVATION CARE/DAY 30 MINUTES: CPT | Performed by: OBSTETRICS & GYNECOLOGY

## 2020-10-06 PROCEDURE — G0378 HOSPITAL OBSERVATION PER HR: HCPCS

## 2020-10-06 PROCEDURE — 59025 FETAL NON-STRESS TEST: CPT | Performed by: OBSTETRICS & GYNECOLOGY

## 2020-10-06 PROCEDURE — 85027 COMPLETE CBC AUTOMATED: CPT | Performed by: OBSTETRICS & GYNECOLOGY

## 2020-10-06 PROCEDURE — 80053 COMPREHEN METABOLIC PANEL: CPT | Performed by: OBSTETRICS & GYNECOLOGY

## 2020-10-06 PROCEDURE — 81003 URINALYSIS AUTO W/O SCOPE: CPT | Performed by: OBSTETRICS & GYNECOLOGY

## 2020-10-06 PROCEDURE — 63710000001 ONDANSETRON PER 8 MG: Performed by: OBSTETRICS & GYNECOLOGY

## 2020-10-06 RX ORDER — ONDANSETRON 4 MG/1
4 TABLET, FILM COATED ORAL EVERY 8 HOURS PRN
Qty: 30 TABLET | Refills: 1 | Status: SHIPPED | OUTPATIENT
Start: 2020-10-06 | End: 2020-11-09 | Stop reason: HOSPADM

## 2020-10-06 RX ORDER — BUTALBITAL, ACETAMINOPHEN AND CAFFEINE 50; 325; 40 MG/1; MG/1; MG/1
2 TABLET ORAL EVERY 4 HOURS PRN
Status: DISCONTINUED | OUTPATIENT
Start: 2020-10-06 | End: 2020-10-06 | Stop reason: HOSPADM

## 2020-10-06 RX ORDER — ONDANSETRON 4 MG/1
4 TABLET, FILM COATED ORAL ONCE
Status: COMPLETED | OUTPATIENT
Start: 2020-10-06 | End: 2020-10-06

## 2020-10-06 RX ADMIN — ONDANSETRON HYDROCHLORIDE 4 MG: 4 TABLET, FILM COATED ORAL at 19:52

## 2020-10-06 NOTE — H&P
[facility]  Obstetric History and Physical    Referring Provider: Ector Apple MD      Chief Complaint   Patient presents with   • Abdominal Cramping       Subjective         Patient is a 30 y.o. female  currently at 34w3d, who presents with multiple complaints.  She reports having mild intermittent headache since yesterday with intermittent vomiting, irregular uterine contractions, and upper abdominal discomfort.  She denies leaking of fluid, vaginal bleeding, chest pain, short of breath, loss of taste or smell.  Prenatal care by Dr. Apple.  Pregnancy has been uncomplicated to date.  Past medical history significant for fibromyalgia, degenerative disc disease lumbar, chronic multiple arthralgias, and history of Lyme disease.        The following portions of the patients history were reviewed and updated as appropriate: current medications, allergies, past medical history, past surgical history, past family history, past social history and problem list .       Prenatal Information:   Maternal Prenatal Labs  Blood Type No results found for: ABO   Rh Status No results found for: RH   Antibody Screen No results found for: ABSCRN   Gonnorhea No results found for: GCCX   Chlamydia No results found for: CLAMYDCU   RPR No results found for: RPR   Syphilis Antibody No results found for: SYPHILIS   Rubella No results found for: RUBELLAIGGIN   Hepatitis B Surface Antigen No results found for: HEPBSAG   HIV-1 Antibody No results found for: LABHIV1   Hepatitis C Antibody No results found for: HEPCAB   Rapid Urin Drug Screen No results found for: AMPMETHU, BARBITSCNUR, LABBENZSCN, LABMETHSCN, LABOPIASCN, THCURSCR, COCAINEUR, AMPHETSCREEN, PROPOXSCN, BUPRENORSCNU, METAMPSCNUR, OXYCODONESCN, TRICYCLICSCN   Group B Strep Culture No results found for: GBSANTIGEN           External Prenatal Results     Pregnancy Outside Results - Transcribed From Office Records - See Scanned Records For Details     Test Value Date Time     Hgb 13.5 g/dL 19 1357    Hct 40.6 % 19 1357    ABO       Rh       Antibody Screen       Glucose Fasting GTT       Glucose Tolerance Test 1 hour       Glucose Tolerance Test 3 hour       Gonorrhea (discrete)       Chlamydia (discrete)       RPR       VDRL       Syphilis Antibody       Rubella       HBsAg       Herpes Simplex Virus PCR       Herpes Simplex VIrus Culture       HIV       Hep C RNA Quant PCR       Hep C Antibody       AFP       Group B Strep       GBS Susceptibility to Clindamycin       GBS Susceptibility to Erythromycin       Fetal Fibronectin       Genetic Testing, Maternal Blood             Drug Screening     Test Value Date Time    Urine Drug Screen       Amphetamine Screen       Barbiturate Screen       Benzodiazepine Screen       Methadone Screen       Phencyclidine Screen       Opiates Screen       THC Screen       Cocaine Screen       Propoxyphene Screen       Buprenorphine Screen       Methamphetamine Screen       Oxycodone Screen       Tricyclic Antidepressants Screen                     Past OB History:       OB History    Para Term  AB Living   1 0 0 0 0 0   SAB TAB Ectopic Molar Multiple Live Births   0 0 0 0 0 0      # Outcome Date GA Lbr Rodrigo/2nd Weight Sex Delivery Anes PTL Lv   1 Current                Past Medical History: Past Medical History:   Diagnosis Date   • Asthma    • Lyme disease, acute     Murray Wei but sees neurologist, endocrinology and gastro   • Migraine    • Pupillary abnormalities 2016    resolved      Past Surgical History Past Surgical History:   Procedure Laterality Date   • COLONOSCOPY      polyps-normal   • ENDOSCOPY     • TONSILECTOMY, ADENOIDECTOMY, BILATERAL MYRINGOTOMY AND TUBES     • TONSILLECTOMY        Family History: Family History   Problem Relation Age of Onset   • Heart disease Mother         cardiac disorder   • Hypertension Mother    • Cancer Mother    • Brain cancer Maternal Grandmother    • Colon cancer  Maternal Grandmother    • Uterine cancer Maternal Grandmother    • Uterine cancer Other    • Breast cancer Other       Social History:  reports that she has never smoked. She has never used smokeless tobacco.   reports no history of alcohol use.   reports no history of drug use.                   General ROS Negative Findings:Visual Changes, Epigastric pain, Anorexia, ROM and Vaginal Bleeding    ROS     All other systems have been reviewed and are neg  Objective       Vital Signs Range for the last 24 hours  Temperature: Temp:  [98.3 °F (36.8 °C)] 98.3 °F (36.8 °C)   Temp Source: Temp src: Oral   BP: BP: (132)/(92) 132/92   Pulse: Heart Rate:  [107] 107   Respirations: Resp:  [16] 16   SPO2:     O2 Amount (l/min):     O2 Devices     Weight: Weight:  [95.7 kg (211 lb)] 95.7 kg (211 lb)     Physical Examination:   General:   alert, appears stated age and cooperative   Skin:   normal   HEENT:  Sclera clear   Lungs:   clear to auscultation bilaterally   Heart:   regular rate and rhythm, S1, S2 normal, no murmur, click, rub or gallop   Abdomen:  Soft, gravid uterus, guarding, rebound benign exam negative CVA tenderness   Lower Extremities  trace edema, no calf shift range of motion   Pelvis:  External genitalia: normal general appearance  Uterus: enlarged     Neuro: No focal deficit noted DTR 2+ 4 no clonus    Presentation:    Cervix: Exam by:     Dilation:  1cm   Effacement:  60   Station:  -3  No blood noted on glove       Fetal Heart Rate Assessment   Method: Fetal HR Assessment Method: external   Beats/min: Fetal HR (beats/min): 145   Baseline: Fetal Heart Baseline Rate: normal range   Varibility: Fetal HR Variability: moderate (amplitude range 6 to 25 bpm)   Accels: Fetal HR Accelerations: greater than/equal to 15 bpm, lasting at least 15 seconds   Decels: Fetal HR Decelerations: variable   Tracing Category:     NST-indications contractions interpretation reactive, moderate variability, accelerations present 15 x  15, no decelerations noted, onset 1720, end time 1936, irregular contractions noted.  Uterine Assessment   Method: Method: external tocotransducer   Frequency (min):     Ctx Count in 10 min:     Duration:     Intensity: Contraction Intensity: no contractions   Intensity by IUPC:     Resting Tone: Uterine Resting Tone: soft by palpation   Resting Tone by IUPC:     [place] Units:       Laboratory Results:   Lab Results (last 24 hours)     Procedure Component Value Units Date/Time    Urinalysis With Microscopic If Indicated (No Culture) - Urine, Clean Catch [[id number]]  (Normal) Collected: 10/06/20 1741    Specimen: Urine, Clean Catch Updated: 10/06/20 1804     Color, UA Yellow     Appearance, UA Clear     pH, UA 6.0     Specific Gravity, UA 1.022     Glucose, UA Negative     Ketones, UA Negative     Bilirubin, UA Negative     Blood, UA Negative     Protein, UA Negative     Leuk Esterase, UA Negative     Nitrite, UA Negative     Urobilinogen, UA 1.0 E.U./dL    Narrative:      Urine microscopic not indicated.        Radiology Review:   Imaging Results (Last 24 Hours)     ** No results found for the last 24 hours. **        Other Studies:    Assessment/Plan       Pregnancy        Assessment:  1.  Intrauterine pregnancy at 34w3d weeks gestation with reactive fetal status.       Benign exam  2.  Patient with multiple complaints (mild headache since yesterday, intermittent contractions, occasional vomiting, and upper abdominal discomfort.)  3.  No evidence of labor rupture membranes  4.  History of Lyme disease no current flareup  5.  History of migraine headaches  Plan:  1.  Observation, CBC, CMP, 2 Fioricet, Zofran. labs normal and symptoms resolved discharged home  2. Plan of care has been reviewed with patient.  3.  Risks, benefits of treatment plan have been discussed.  4.  All questions have been answered.  5      Jimbo Robles DO  10/6/2020  19:34 EDT

## 2020-10-13 ENCOUNTER — ROUTINE PRENATAL (OUTPATIENT)
Dept: OBSTETRICS AND GYNECOLOGY | Facility: CLINIC | Age: 30
End: 2020-10-13

## 2020-10-13 VITALS — WEIGHT: 209 LBS | BODY MASS INDEX: 38.23 KG/M2 | SYSTOLIC BLOOD PRESSURE: 120 MMHG | DIASTOLIC BLOOD PRESSURE: 80 MMHG

## 2020-10-13 DIAGNOSIS — Z3A.35 35 WEEKS GESTATION OF PREGNANCY: Primary | ICD-10-CM

## 2020-10-13 LAB
GLUCOSE UR STRIP-MCNC: NEGATIVE MG/DL
PROT UR STRIP-MCNC: NEGATIVE MG/DL

## 2020-10-13 PROCEDURE — 99212 OFFICE O/P EST SF 10 MIN: CPT | Performed by: OBSTETRICS & GYNECOLOGY

## 2020-10-13 NOTE — PROGRESS NOTES
OB FOLLOW UP  CC- Here for care of pregnancy        Alexia Adam is a 30 y.o.  35w3d patient being seen today for her obstetrical follow up visit. Patient reports backache, nausea and vomiting. Seen at L/D for n/v, given Zofran. Having cramping no vn or lof, comes /goes. Good fm. Occ BH ctx. Severe LBP.    Her prenatal care is complicated by (and status) :    Patient Active Problem List   Diagnosis   • Headache disorder   • Sequelae of infectious or parasitic diseases   • Paresthesia   • Arthralgia of multiple joints   • Myalgia   • Pupillary abnormalities   • Migraine without aura and without status migrainosus, not intractable   • Neck pain   • Joint swelling   • Fibromyalgia   • DDD (degenerative disc disease), lumbar   • Mechanical low back pain   • Nocturnal leg cramps   • Pregnancy       Flu Status: Desires at future appt    The additional following portions of the patient's history were reviewed and updated as appropriate: allergies, current medications, past family history, past medical history, past social history, past surgical history and problem list.    ROS -   Patient Reports : Cramping/Contractions   Patient Denies: Loss of Fluid, Vaginal Spotting, Vision Changes and Headaches  Fetal Movement : normal  All other systems reviewed and are negative.     I have reviewed and agree with the HPI, ROS, and historical information as entered above. Ector Apple MD    /80   Wt 94.8 kg (209 lb)   LMP 2020   BMI 38.23 kg/m²     Ultrasound Today: no    EXAM:   Vitals: See prenatal flowsheet   Abdomen: See prenatal flowsheet   Urine glucose/protein: See prenatal flowsheet   Pelvic: See prenatal flowsheet   HRT: See prenatal flowsheet   Presentation: See prenatal flowsheet   Movement: See prenatal flowsheet     GBS today, 0-1/80/-2 cephalic     Assessment and Plan    Problem List Items Addressed This Visit        Other    Pregnancy - Primary    Relevant Orders    POC Urinalysis  Dipstick, Automated    Group B Streptococcus Culture - Swab, Vagina          1. Pregnancy at 35w3d  2. Fetal status reassuring.   3. Activity and Exercise discussed.  No follow-ups on file.    Ector Apple MD  10/13/2020

## 2020-10-17 LAB — B-HEM STREP SPEC QL CULT: POSITIVE

## 2020-10-20 ENCOUNTER — ROUTINE PRENATAL (OUTPATIENT)
Dept: OBSTETRICS AND GYNECOLOGY | Facility: CLINIC | Age: 30
End: 2020-10-20

## 2020-10-20 VITALS — BODY MASS INDEX: 38.04 KG/M2 | DIASTOLIC BLOOD PRESSURE: 78 MMHG | SYSTOLIC BLOOD PRESSURE: 120 MMHG | WEIGHT: 208 LBS

## 2020-10-20 DIAGNOSIS — Z3A.36 36 WEEKS GESTATION OF PREGNANCY: Primary | ICD-10-CM

## 2020-10-20 LAB
GLUCOSE UR STRIP-MCNC: NEGATIVE MG/DL
PROT UR STRIP-MCNC: NEGATIVE MG/DL

## 2020-10-20 PROCEDURE — 99213 OFFICE O/P EST LOW 20 MIN: CPT | Performed by: NURSE PRACTITIONER

## 2020-10-20 NOTE — PROGRESS NOTES
OB FOLLOW UP  CC- Here for care of pregnancy        Alexia Adam is a 30 y.o.  36w3d patient being seen today for her obstetrical follow up visit. Patient has been feeling good, GFM, pt states she get her movement but he has slowed down. Still gets adequate daily fetal movements.   Pt denies nausea, vomiting, headaches, vision change.  Pt states she is having some BH ctxs.      Her prenatal care is complicated by (and status) :    Patient Active Problem List   Diagnosis   • Headache disorder   • Sequelae of infectious or parasitic diseases   • Paresthesia   • Arthralgia of multiple joints   • Myalgia   • Pupillary abnormalities   • Migraine without aura and without status migrainosus, not intractable   • Neck pain   • Joint swelling   • Fibromyalgia   • DDD (degenerative disc disease), lumbar   • Mechanical low back pain   • Nocturnal leg cramps   • Pregnancy       Flu Status: Will get at work/pharmacy/other facility     The additional following portions of the patient's history were reviewed and updated as appropriate: allergies, current medications, past family history, past medical history, past social history, past surgical history and problem list.    ROS -   Patient Reports : NO problems  Patient Denies: nausea, vomiting, headaches.  Fetal Movement : Normal   All other systems reviewed and are negative.     I have reviewed and agree with the HPI, ROS, and historical information as entered above. Mikala Joby, APRN    /78   Wt 94.3 kg (208 lb)   LMP 2020   BMI 38.04 kg/m²     Ultrasound Today: no    EXAM:   Vitals: See prenatal flowsheet   Abdomen: See prenatal flowsheet   Urine glucose/protein: See prenatal flowsheet   Pelvic: See prenatal flowsheet   HRT: See prenatal flowsheet   Presentation: See prenatal flowsheet   Movement: See prenatal flowsheet       GBS Status: GBS is POSITIVE  Her Delivery Plan is: vaginal       Assessment and Plan    Problem List Items Addressed This  Visit        Other    Pregnancy - Primary    Relevant Orders    POC Urinalysis Dipstick (Completed)          1. Pregnancy at 36w3d  2. Fetal status reassuring.   3. Activity and Exercise discussed.  Labor signs reviewed    Mikala Hemphill, APRN  10/20/2020

## 2020-10-22 ENCOUNTER — HOSPITAL ENCOUNTER (OUTPATIENT)
Facility: HOSPITAL | Age: 30
Setting detail: OBSERVATION
Discharge: HOME | End: 2020-10-22
Attending: OBSTETRICS & GYNECOLOGY | Admitting: OBSTETRICS & GYNECOLOGY

## 2020-10-22 VITALS
SYSTOLIC BLOOD PRESSURE: 131 MMHG | HEIGHT: 62 IN | TEMPERATURE: 98.2 F | HEART RATE: 77 BPM | BODY MASS INDEX: 38.28 KG/M2 | WEIGHT: 208 LBS | DIASTOLIC BLOOD PRESSURE: 79 MMHG | RESPIRATION RATE: 16 BRPM

## 2020-10-22 PROBLEM — O47.03 FALSE LABOR BEFORE 37 COMPLETED WEEKS OF GESTATION IN THIRD TRIMESTER: Status: ACTIVE | Noted: 2020-10-22

## 2020-10-22 LAB — POC AMNISURE: NEGATIVE

## 2020-10-22 PROCEDURE — 99219 PR INITIAL OBSERVATION CARE/DAY 50 MINUTES: CPT | Performed by: OBSTETRICS & GYNECOLOGY

## 2020-10-22 PROCEDURE — 84112 EVAL AMNIOTIC FLUID PROTEIN: CPT | Performed by: OBSTETRICS & GYNECOLOGY

## 2020-10-22 PROCEDURE — G0378 HOSPITAL OBSERVATION PER HR: HCPCS

## 2020-10-22 PROCEDURE — 59025 FETAL NON-STRESS TEST: CPT

## 2020-10-22 PROCEDURE — 59025 FETAL NON-STRESS TEST: CPT | Performed by: OBSTETRICS & GYNECOLOGY

## 2020-10-22 NOTE — H&P
"Alexia Adam  1990  [id number]  [id number]    CC: leaking  HPI:  Patient is 30 y.o. female   currently at 36w5d presents with c/o leaking while squatting down.  Occurred at ~0130, once, denies assoc vag bleeding or contractions.  Good FM.  PNC comp by bhavin asthma.     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   All other systems reviewed and are negative.      Physical Examination: General appearance - alert, well appearing, and in no distress  Vital signs - /79 (BP Location: Right arm)   Pulse 77   Temp 98.2 °F (36.8 °C)   Resp 16   Ht 157.5 cm (62\")   Wt 94.3 kg (208 lb)   LMP 2020   BMI 38.04 kg/m²   HEENT: normocephalic, atraumatic,oropharynx clear, appearance of ears and nose normal  Neck - supple, no significant adenopathy, no thyromegaly  Lymphatics - no palpable lymphadenopathy in the neck or groin, no hepatosplenomegaly  Chest - clear to auscultation, no wheezes, rales or rhonchi, respiratory effort non-labored  Heart - normal rate, regular rhythm, no murmurs, rubs, clicks or gallops, no JVD, no lower extremity edema  Abdomen - soft, nontender, nondistended, no masses, no hepatosplenomegaly  no rebound tenderness noted, bowel sounds normal  Vaginal Exam: 60%/-2, no blood in vault ,external genitalia normal (per RN), amnisure neg  Extremities - no pedal edema noted, no calf tend  Skin -warm and dry, " normal coloration and turgor, no rashes, no suspicious skin lesions noted      Fetal monitoring: indication leaking fluid , onset 0215 , offset 0252 , baseline 140 , mod BTB variability , multiple accels (15 X 15), no decels, rare contractions, interpretation reactive NST    Radiology     Assessment 1)IUP 36 5/7 weeks   2)false labor   3)maternal asthma       Plan 1)observe    2)home     3)keep next sched appt    Russell Ibarra MD  10/22/2020  03:07 EDT

## 2020-10-27 ENCOUNTER — ROUTINE PRENATAL (OUTPATIENT)
Dept: OBSTETRICS AND GYNECOLOGY | Facility: CLINIC | Age: 30
End: 2020-10-27

## 2020-10-27 VITALS — BODY MASS INDEX: 38.04 KG/M2 | SYSTOLIC BLOOD PRESSURE: 120 MMHG | WEIGHT: 208 LBS | DIASTOLIC BLOOD PRESSURE: 82 MMHG

## 2020-10-27 DIAGNOSIS — O36.5939 SGA (SMALL FOR GESTATIONAL AGE), FETAL, AFFECTING CARE OF MOTHER, ANTEPARTUM, THIRD TRIMESTER, OTHER FETUS: ICD-10-CM

## 2020-10-27 DIAGNOSIS — Z3A.35 35 WEEKS GESTATION OF PREGNANCY: Primary | ICD-10-CM

## 2020-10-27 LAB
GLUCOSE UR STRIP-MCNC: NEGATIVE MG/DL
PROT UR STRIP-MCNC: NEGATIVE MG/DL

## 2020-10-27 PROCEDURE — 99213 OFFICE O/P EST LOW 20 MIN: CPT | Performed by: OBSTETRICS & GYNECOLOGY

## 2020-10-27 RX ORDER — HYDROCORTISONE ACETATE AND PRAMOXINE HYDROCHLORIDE 25; 10 MG/G; MG/G
CREAM TOPICAL 3 TIMES DAILY
Qty: 30 G | Refills: 2 | Status: SHIPPED | OUTPATIENT
Start: 2020-10-27 | End: 2020-11-09 | Stop reason: HOSPADM

## 2020-10-27 NOTE — PROGRESS NOTES
OB FOLLOW UP  CC- Here for care of pregnancy        Alexia Adam is a 30 y.o.  37w3d patient being seen today for her obstetrical follow up visit. Patient reports cramping vs ctxs, hemmroids, blurred vision, GFM.     Her prenatal care is complicated by (and status) :    Patient Active Problem List   Diagnosis   • Headache disorder   • Sequelae of infectious or parasitic diseases   • Paresthesia   • Arthralgia of multiple joints   • Myalgia   • Pupillary abnormalities   • Migraine without aura and without status migrainosus, not intractable   • Neck pain   • Joint swelling   • Fibromyalgia   • DDD (degenerative disc disease), lumbar   • Mechanical low back pain   • Nocturnal leg cramps   • Pregnancy   • False labor before 37 completed weeks of gestation in third trimester       Flu Status: Will get at work/pharmacy/other facility     The additional following portions of the patient's history were reviewed and updated as appropriate: past social history, past surgical history and problem list.  Pt states she is staying nauseated all the time.  Pt. States she is staying in the bath tub to get relief of ctxs. And hemmroids.  Patient Reports : Cramping/Contractions   Patient Denies: Loss of Fluid and Vaginal Spotting  Fetal Movement : normal  All other systems reviewed and are negative.     I have reviewed and agree with the HPI, ROS, and historical information as entered above. Ector Apple MD    /82   Wt 94.3 kg (208 lb)   LMP 2020   BMI 38.04 kg/m²     Ultrasound Today: no    EXAM:   Vitals: See prenatal flowsheet   Abdomen: See prenatal flowsheet   Urine glucose/protein: See prenatal flowsheet   Pelvic: See prenatal flowsheet   HRT: See prenatal flowsheet   Presentation: See prenatal flowsheet   Movement: See prenatal flowsheet          Assessment and Plan    Problem List Items Addressed This Visit        Other    Pregnancy - Primary    Relevant Orders    POC Urinalysis Dipstick  (Completed)    US Ob Follow Up Transabdominal Approach          1. Pregnancy at 37w3d  2. Fetal status reassuring.   3. Activity and Exercise discussed.  No follow-ups on file.  Small for gestational age 10 percentile we will start testing twice weekly, NST this Friday  Will give pramosine hydrocortisone  Ector Apple MD  10/27/2020

## 2020-10-28 NOTE — TELEPHONE ENCOUNTER
----- Message from DIMITRI George sent at 2/4/2020  9:46 AM EST -----  Please notify patient her nerve and muscle study is completely normal. This is normal which is consistent with fibromyalgia which we have been treating since neuropathy and disc problems have been ruled out. We will follow up as scheduled in office. Thanks, DIMITRI Maurice please fax copy to pcp.   aspiration precautions/fall precautions/seizure precautions

## 2020-10-30 ENCOUNTER — ROUTINE PRENATAL (OUTPATIENT)
Dept: OBSTETRICS AND GYNECOLOGY | Facility: CLINIC | Age: 30
End: 2020-10-30

## 2020-10-30 VITALS
SYSTOLIC BLOOD PRESSURE: 118 MMHG | DIASTOLIC BLOOD PRESSURE: 78 MMHG | WEIGHT: 210 LBS | TEMPERATURE: 97.3 F | BODY MASS INDEX: 38.41 KG/M2

## 2020-10-30 DIAGNOSIS — O36.5939 SGA (SMALL FOR GESTATIONAL AGE), FETAL, AFFECTING CARE OF MOTHER, ANTEPARTUM, THIRD TRIMESTER, OTHER FETUS: ICD-10-CM

## 2020-10-30 DIAGNOSIS — Z3A.35 35 WEEKS GESTATION OF PREGNANCY: ICD-10-CM

## 2020-10-30 DIAGNOSIS — Z3A.37 37 WEEKS GESTATION OF PREGNANCY: Primary | ICD-10-CM

## 2020-10-30 DIAGNOSIS — O28.8 NON-REACTIVE NST (NON-STRESS TEST): ICD-10-CM

## 2020-10-30 LAB
GLUCOSE UR STRIP-MCNC: NEGATIVE MG/DL
PROT UR STRIP-MCNC: NEGATIVE MG/DL

## 2020-10-30 PROCEDURE — 99213 OFFICE O/P EST LOW 20 MIN: CPT | Performed by: NURSE PRACTITIONER

## 2020-10-30 RX ORDER — HYDROCORTISONE 25 MG/G
CREAM TOPICAL
COMMUNITY
Start: 2020-10-29 | End: 2020-11-09 | Stop reason: HOSPADM

## 2020-10-30 NOTE — PROGRESS NOTES
OB FOLLOW UP  CC- Here for care of pregnancy        Alexia Adam is a 30 y.o.  37w6d patient being seen today for her obstetrical follow up visit. Patient reports headache and blurry vision. NST today for SGA.     Her prenatal care is complicated by (and status) :    Patient Active Problem List   Diagnosis   • Headache disorder   • Sequelae of infectious or parasitic diseases   • Paresthesia   • Arthralgia of multiple joints   • Myalgia   • Pupillary abnormalities   • Migraine without aura and without status migrainosus, not intractable   • Neck pain   • Joint swelling   • Fibromyalgia   • DDD (degenerative disc disease), lumbar   • Mechanical low back pain   • Nocturnal leg cramps   • Pregnancy   • False labor before 37 completed weeks of gestation in third trimester   • SGA (small for gestational age), fetal, affecting care of mother, antepartum, third trimester, other fetus       Flu Status: Will get at work/pharmacy/other facility     The additional following portions of the patient's history were reviewed and updated as appropriate: allergies, current medications, past family history, past medical history, past social history, past surgical history and problem list.    ROS -   Patient Reports : Vision Changes and Headaches  Patient Denies: Loss of Fluid, Vaginal Spotting, Nausea , Vomiting  and Epigastric pain  Fetal Movement : normal  All other systems reviewed and are negative.     I have reviewed and agree with the HPI, ROS, and historical information as entered above. Barbara York, APRN    /78   Temp 97.3 °F (36.3 °C)   Wt 95.3 kg (210 lb)   LMP 2020   BMI 38.41 kg/m²     Ultrasound Today: No.    EXAM:     FHT: positive  Pelvic Exam: No    Urine glucose/protein: See prenatal flowsheet       Assessment and Plan    Problem List Items Addressed This Visit        Other    Pregnancy - Primary    Relevant Orders    POC Urinalysis Dipstick (Completed)    US Fetal Biophysical  Profile;With Non-Stress Testing    SGA (small for gestational age), fetal, affecting care of mother, antepartum, third trimester, other fetus      Other Visit Diagnoses     Non-reactive NST (non-stress test)        Relevant Orders    US Fetal Biophysical Profile;With Non-Stress Testing          1. Pregnancy at 37w6d  2. SGA- nonreactive NST, BPP 8/8.   3. Kick counts and labor precautions reviewed.   4.   Continue BPPs on tuesdays, NSTs on Fridays.    Barbara York, DIMITRI  10/30/2020

## 2020-11-03 ENCOUNTER — ROUTINE PRENATAL (OUTPATIENT)
Dept: OBSTETRICS AND GYNECOLOGY | Facility: CLINIC | Age: 30
End: 2020-11-03

## 2020-11-03 VITALS — WEIGHT: 210 LBS | DIASTOLIC BLOOD PRESSURE: 74 MMHG | SYSTOLIC BLOOD PRESSURE: 128 MMHG | BODY MASS INDEX: 38.41 KG/M2

## 2020-11-03 DIAGNOSIS — Z3A.38 38 WEEKS GESTATION OF PREGNANCY: Primary | ICD-10-CM

## 2020-11-03 DIAGNOSIS — O36.5939 SGA (SMALL FOR GESTATIONAL AGE), FETAL, AFFECTING CARE OF MOTHER, ANTEPARTUM, THIRD TRIMESTER, OTHER FETUS: ICD-10-CM

## 2020-11-03 LAB
GLUCOSE UR STRIP-MCNC: NEGATIVE MG/DL
PROT UR STRIP-MCNC: NEGATIVE MG/DL

## 2020-11-03 PROCEDURE — 99213 OFFICE O/P EST LOW 20 MIN: CPT | Performed by: OBSTETRICS & GYNECOLOGY

## 2020-11-03 NOTE — PROGRESS NOTES
OB FOLLOW UP  CC- Here for care of pregnancy        Alexia Adam is a 30 y.o.  38w3d patient being seen today for her obstetrical follow up visit. Patient reports backache and headache.     Her prenatal care is complicated by (and status) :    Patient Active Problem List   Diagnosis   • Headache disorder   • Sequelae of infectious or parasitic diseases   • Paresthesia   • Arthralgia of multiple joints   • Myalgia   • Pupillary abnormalities   • Migraine without aura and without status migrainosus, not intractable   • Neck pain   • Joint swelling   • Fibromyalgia   • DDD (degenerative disc disease), lumbar   • Mechanical low back pain   • Nocturnal leg cramps   • Pregnancy   • False labor before 37 completed weeks of gestation in third trimester   • SGA (small for gestational age), fetal, affecting care of mother, antepartum, third trimester, other fetus   • SGA (small for gestational age), fetal, affecting care of mother, antepartum, third trimester, other fetus       Flu Status: Desires at future appt    The additional following portions of the patient's history were reviewed and updated as appropriate: past medical history, past social history, past surgical history and problem list.    ROS -   Patient Reports : Headaches, nausea and vomiting, vision changes 1 week ago.  Patient Denies: Loss of Fluid and Vaginal Spotting  Fetal Movement : normal  All other systems reviewed and are negative.     GBS Status: Was already done and it was positive.   Her Delivery Plan is: unsure    I have reviewed and agree with the HPI, ROS, and historical information as entered above. Ector Apple MD    Ultrasound Today: yes    /74   Wt 95.3 kg (210 lb)   LMP 2020   BMI 38.41 kg/m²     EXAM:     FHT:150 BPM   Uterine Size: 35 cm  Pelvic Exam: Yes.  Presentation: cephalic. Dilation: 2cm. Effacement: 50%. Station: -1.    Urine glucose/protein: See prenatal flowsheet       Assessment and  Plan    Problem List Items Addressed This Visit        Other    Pregnancy - Primary    Relevant Orders    POC Urinalysis Dipstick (Completed)    SGA (small for gestational age), fetal, affecting care of mother, antepartum, third trimester, other fetus          1. Pregnancy at 38w3d  2. Fetal status reassuring.   3. Activity and Exercise discussed.  No follow-ups on file.    Ector Apple MD  2020   OB FOLLOW UP    Alexia Adam is a 30 y.o.  38w3d patient being seen today for her obstetrical follow up visit. Patient reports backache.     Her prenatal care is complicated by (and status) : SGA    ROS -   Contractions irregular   problems denies  GI problems denies  Bleeding or Leaking denies  Fetal Movement : Good fetal movement      Current Outpatient Medications:   •  acetaminophen (TYLENOL) 325 MG tablet, Take 650 mg by mouth As Needed., Disp: , Rfl:   •  albuterol sulfate  (90 Base) MCG/ACT inhaler, Inhale 2 puffs As Needed., Disp: , Rfl:   •  diphenhydrAMINE (BENADRYL) 25 MG tablet, Take 25 mg by mouth As Needed., Disp: , Rfl:   •  ondansetron (Zofran) 4 MG tablet, Take 1 tablet by mouth Every 8 (Eight) Hours As Needed for Nausea or Vomiting for up to 30 days., Disp: 30 tablet, Rfl: 1  •  Pediatric Multiple Vit-C-FA (Flinstones Gummies Omega-3 DHA) chewable tablet, Chew., Disp: , Rfl:   •  pramoxine-hydrocortisone (Pramosone) 1-2.5 % cream, Apply  topically to the appropriate area as directed 3 (Three) Times a Day for 14 days., Disp: 30 g, Rfl: 2  •  Procto-Med HC 2.5 % rectal cream, APPLY CREAM RECTALLY THREE TIMES DAILY FOR 14 DAYS, Disp: , Rfl:     /74   Wt 95.3 kg (210 lb)   LMP 2020   BMI 38.41 kg/m²     FHT:  150 BPM    Uterine Size: size less than dates   Presentations: cephalic        Uterus-nontender   Assessment    4. Pregnancy at 38w3d  5. Fetal status reassuring     Problem List Items Addressed This Visit        Other    Pregnancy - Primary    Relevant  Orders    POC Urinalysis Dipstick (Completed)    SGA (small for gestational age), fetal, affecting care of mother, antepartum, third trimester, other fetus          Plan    1. P/patient return in 2 days weeks  2. Prenatal teaching done and patient questions were answered  3. SGA at 10 percentile induced at 39 weeks, 8/22/2020 at 9:30 PM    Ector Apple MD  11/03/2020

## 2020-11-05 ENCOUNTER — ROUTINE PRENATAL (OUTPATIENT)
Dept: OBSTETRICS AND GYNECOLOGY | Facility: CLINIC | Age: 30
End: 2020-11-05

## 2020-11-05 ENCOUNTER — HOSPITAL ENCOUNTER (INPATIENT)
Facility: HOSPITAL | Age: 30
LOS: 4 days | Discharge: HOME | End: 2020-11-09
Attending: OBSTETRICS & GYNECOLOGY | Admitting: OBSTETRICS & GYNECOLOGY

## 2020-11-05 VITALS — DIASTOLIC BLOOD PRESSURE: 70 MMHG | WEIGHT: 211 LBS | SYSTOLIC BLOOD PRESSURE: 120 MMHG | BODY MASS INDEX: 38.59 KG/M2

## 2020-11-05 DIAGNOSIS — Z3A.38 38 WEEKS GESTATION OF PREGNANCY: Primary | ICD-10-CM

## 2020-11-05 PROBLEM — Z37.9 NORMAL LABOR: Status: ACTIVE | Noted: 2020-11-05

## 2020-11-05 LAB
ALP SERPL-CCNC: 243 U/L (ref 39–117)
ALT SERPL W P-5'-P-CCNC: 20 U/L (ref 1–33)
AST SERPL-CCNC: 25 U/L (ref 1–32)
BILIRUB SERPL-MCNC: 0.3 MG/DL (ref 0–1.2)
CREAT SERPL-MCNC: 0.54 MG/DL (ref 0.57–1)
DEPRECATED RDW RBC AUTO: 43.1 FL (ref 37–54)
ERYTHROCYTE [DISTWIDTH] IN BLOOD BY AUTOMATED COUNT: 13.2 % (ref 12.3–15.4)
GLUCOSE UR STRIP-MCNC: NEGATIVE MG/DL
HCT VFR BLD AUTO: 39.4 % (ref 34–46.6)
HGB BLD-MCNC: 12.9 G/DL (ref 12–15.9)
LDH SERPL-CCNC: 236 U/L (ref 135–214)
MCH RBC QN AUTO: 29.3 PG (ref 26.6–33)
MCHC RBC AUTO-ENTMCNC: 32.7 G/DL (ref 31.5–35.7)
MCV RBC AUTO: 89.3 FL (ref 79–97)
PLATELET # BLD AUTO: 278 10*3/MM3 (ref 140–450)
PMV BLD AUTO: 10 FL (ref 6–12)
PROT UR STRIP-MCNC: NEGATIVE MG/DL
RBC # BLD AUTO: 4.41 10*6/MM3 (ref 3.77–5.28)
URATE SERPL-MCNC: 4.6 MG/DL (ref 2.4–5.7)
WBC # BLD AUTO: 11.43 10*3/MM3 (ref 3.4–10.8)

## 2020-11-05 PROCEDURE — 86901 BLOOD TYPING SEROLOGIC RH(D): CPT | Performed by: OBSTETRICS & GYNECOLOGY

## 2020-11-05 PROCEDURE — 84460 ALANINE AMINO (ALT) (SGPT): CPT | Performed by: OBSTETRICS & GYNECOLOGY

## 2020-11-05 PROCEDURE — 25010000002 PENICILLIN G POTASSIUM PER 600000 UNITS: Performed by: OBSTETRICS & GYNECOLOGY

## 2020-11-05 PROCEDURE — 86900 BLOOD TYPING SEROLOGIC ABO: CPT

## 2020-11-05 PROCEDURE — 82247 BILIRUBIN TOTAL: CPT | Performed by: OBSTETRICS & GYNECOLOGY

## 2020-11-05 PROCEDURE — 86901 BLOOD TYPING SEROLOGIC RH(D): CPT

## 2020-11-05 PROCEDURE — S0260 H&P FOR SURGERY: HCPCS | Performed by: OBSTETRICS & GYNECOLOGY

## 2020-11-05 PROCEDURE — 84550 ASSAY OF BLOOD/URIC ACID: CPT | Performed by: OBSTETRICS & GYNECOLOGY

## 2020-11-05 PROCEDURE — 82565 ASSAY OF CREATININE: CPT | Performed by: OBSTETRICS & GYNECOLOGY

## 2020-11-05 PROCEDURE — 86900 BLOOD TYPING SEROLOGIC ABO: CPT | Performed by: OBSTETRICS & GYNECOLOGY

## 2020-11-05 PROCEDURE — U0004 COV-19 TEST NON-CDC HGH THRU: HCPCS | Performed by: OBSTETRICS & GYNECOLOGY

## 2020-11-05 PROCEDURE — 86850 RBC ANTIBODY SCREEN: CPT | Performed by: OBSTETRICS & GYNECOLOGY

## 2020-11-05 PROCEDURE — 84450 TRANSFERASE (AST) (SGOT): CPT | Performed by: OBSTETRICS & GYNECOLOGY

## 2020-11-05 PROCEDURE — 83615 LACTATE (LD) (LDH) ENZYME: CPT | Performed by: OBSTETRICS & GYNECOLOGY

## 2020-11-05 PROCEDURE — 85027 COMPLETE CBC AUTOMATED: CPT | Performed by: OBSTETRICS & GYNECOLOGY

## 2020-11-05 PROCEDURE — 86870 RBC ANTIBODY IDENTIFICATION: CPT | Performed by: OBSTETRICS & GYNECOLOGY

## 2020-11-05 PROCEDURE — 99213 OFFICE O/P EST LOW 20 MIN: CPT | Performed by: NURSE PRACTITIONER

## 2020-11-05 PROCEDURE — 84075 ASSAY ALKALINE PHOSPHATASE: CPT | Performed by: OBSTETRICS & GYNECOLOGY

## 2020-11-05 RX ORDER — OXYTOCIN-SODIUM CHLORIDE 0.9% IV SOLN 30 UNIT/500ML 30-0.9/5 UT/ML-%
650 SOLUTION INTRAVENOUS ONCE
Status: CANCELLED | OUTPATIENT
Start: 2020-11-05 | End: 2020-11-05

## 2020-11-05 RX ORDER — SODIUM CHLORIDE 0.9 % (FLUSH) 0.9 %
10 SYRINGE (ML) INJECTION AS NEEDED
Status: DISCONTINUED | OUTPATIENT
Start: 2020-11-05 | End: 2020-11-06

## 2020-11-05 RX ORDER — BUTORPHANOL TARTRATE 1 MG/ML
1 INJECTION, SOLUTION INTRAMUSCULAR; INTRAVENOUS
Status: DISCONTINUED | OUTPATIENT
Start: 2020-11-05 | End: 2020-11-06

## 2020-11-05 RX ORDER — LIDOCAINE HYDROCHLORIDE 10 MG/ML
5 INJECTION, SOLUTION EPIDURAL; INFILTRATION; INTRACAUDAL; PERINEURAL AS NEEDED
Status: DISCONTINUED | OUTPATIENT
Start: 2020-11-05 | End: 2020-11-06

## 2020-11-05 RX ORDER — PENICILLIN G 3000000 [IU]/50ML
3 INJECTION, SOLUTION INTRAVENOUS EVERY 4 HOURS
Status: DISCONTINUED | OUTPATIENT
Start: 2020-11-06 | End: 2020-11-06

## 2020-11-05 RX ORDER — ONDANSETRON 4 MG/1
4 TABLET, FILM COATED ORAL EVERY 6 HOURS PRN
Status: DISCONTINUED | OUTPATIENT
Start: 2020-11-05 | End: 2020-11-06

## 2020-11-05 RX ORDER — SODIUM CHLORIDE 0.9 % (FLUSH) 0.9 %
3 SYRINGE (ML) INJECTION EVERY 12 HOURS SCHEDULED
Status: DISCONTINUED | OUTPATIENT
Start: 2020-11-05 | End: 2020-11-06

## 2020-11-05 RX ORDER — SODIUM CHLORIDE, SODIUM LACTATE, POTASSIUM CHLORIDE, CALCIUM CHLORIDE 600; 310; 30; 20 MG/100ML; MG/100ML; MG/100ML; MG/100ML
125 INJECTION, SOLUTION INTRAVENOUS CONTINUOUS
Status: DISCONTINUED | OUTPATIENT
Start: 2020-11-05 | End: 2020-11-06

## 2020-11-05 RX ORDER — ALBUTEROL SULFATE 2.5 MG/3ML
2.5 SOLUTION RESPIRATORY (INHALATION)
Status: DISCONTINUED | OUTPATIENT
Start: 2020-11-05 | End: 2020-11-06

## 2020-11-05 RX ORDER — ACETAMINOPHEN 325 MG/1
650 TABLET ORAL EVERY 4 HOURS PRN
Status: DISCONTINUED | OUTPATIENT
Start: 2020-11-05 | End: 2020-11-06

## 2020-11-05 RX ORDER — MAGNESIUM CARB/ALUMINUM HYDROX 105-160MG
30 TABLET,CHEWABLE ORAL ONCE
Status: DISCONTINUED | OUTPATIENT
Start: 2020-11-05 | End: 2020-11-06

## 2020-11-05 RX ORDER — ONDANSETRON 2 MG/ML
4 INJECTION INTRAMUSCULAR; INTRAVENOUS EVERY 6 HOURS PRN
Status: DISCONTINUED | OUTPATIENT
Start: 2020-11-05 | End: 2020-11-06

## 2020-11-05 RX ORDER — OXYTOCIN-SODIUM CHLORIDE 0.9% IV SOLN 30 UNIT/500ML 30-0.9/5 UT/ML-%
85 SOLUTION INTRAVENOUS ONCE
Status: CANCELLED | OUTPATIENT
Start: 2020-11-05 | End: 2020-11-05

## 2020-11-05 RX ORDER — BUTORPHANOL TARTRATE 1 MG/ML
2 INJECTION, SOLUTION INTRAMUSCULAR; INTRAVENOUS
Status: DISCONTINUED | OUTPATIENT
Start: 2020-11-05 | End: 2020-11-06

## 2020-11-05 RX ADMIN — SODIUM CHLORIDE, POTASSIUM CHLORIDE, SODIUM LACTATE AND CALCIUM CHLORIDE 125 ML/HR: 600; 310; 30; 20 INJECTION, SOLUTION INTRAVENOUS at 22:48

## 2020-11-05 RX ADMIN — SODIUM CHLORIDE 5 MILLION UNITS: 900 INJECTION INTRAVENOUS at 22:54

## 2020-11-05 NOTE — PROGRESS NOTES
OB FOLLOW UP  CC- Here for care of pregnancy        Alexia Adam is a 30 y.o.  38w5d patient being seen today for her obstetrical follow up visit. Patient reports menstrual type cramping. Good fm, poss ctx, +pelvic pressure. GBS Postive    Her prenatal care is complicated by (and status) :    Patient Active Problem List   Diagnosis   • Headache disorder   • Sequelae of infectious or parasitic diseases   • Paresthesia   • Arthralgia of multiple joints   • Myalgia   • Pupillary abnormalities   • Migraine without aura and without status migrainosus, not intractable   • Neck pain   • Joint swelling   • Fibromyalgia   • DDD (degenerative disc disease), lumbar   • Mechanical low back pain   • Nocturnal leg cramps   • Pregnancy   • False labor before 37 completed weeks of gestation in third trimester   • SGA (small for gestational age), fetal, affecting care of mother, antepartum, third trimester, other fetus   • SGA (small for gestational age), fetal, affecting care of mother, antepartum, third trimester, other fetus       Flu Status: Will get at work/pharmacy/other facility     The additional following portions of the patient's history were reviewed and updated as appropriate: allergies, current medications, past family history, past medical history, past social history, past surgical history and problem list.    ROS -   Patient Reports : Cramping  Patient Denies: Loss of Fluid, Vaginal Spotting, Vision Changes and Headaches  Fetal Movement : normal  All other systems reviewed and are negative.     I have reviewed and agree with the HPI, ROS, and historical information as entered above. DIMITRI El    /70   Wt 95.7 kg (211 lb)   LMP 2020   BMI 38.59 kg/m²     Ultrasound Today: Yes.  Findings showed BPP 8/8.  I have personally evaluated the U/S and agree with the findings. DIMITRI El    EXAM:     FHT: 147 BPM   Uterine Size:   Pelvic Exam: Yes.  Presentation: cephalic.  Dilation: 3cm. Effacement: 75%. Station: -2.    Urine glucose/protein: See prenatal flowsheet       Assessment and Plan    Problem List Items Addressed This Visit        Other    Pregnancy - Primary    Relevant Orders    POC Urinalysis Dipstick, Automated (Completed)          1. Pregnancy at 38w5d  2. Fetal status reassuring.   3. Activity and Exercise discussed.  Labor signs reviewed  Advised monitor daily fetal movements  GBS positive  IOL scheduled 11/8/20 at 9:30pm, covid testing 11/6/20    DIMITRI El  11/05/2020

## 2020-11-06 ENCOUNTER — ANESTHESIA EVENT (OUTPATIENT)
Dept: LABOR AND DELIVERY | Facility: HOSPITAL | Age: 30
End: 2020-11-06

## 2020-11-06 ENCOUNTER — ANESTHESIA (OUTPATIENT)
Dept: LABOR AND DELIVERY | Facility: HOSPITAL | Age: 30
End: 2020-11-06

## 2020-11-06 ENCOUNTER — APPOINTMENT (OUTPATIENT)
Dept: PREADMISSION TESTING | Facility: HOSPITAL | Age: 30
End: 2020-11-06

## 2020-11-06 PROBLEM — O36.8390 FETAL BRADYCARDIA, ANTEPARTUM CONDITION OR COMPLICATION: Status: ACTIVE | Noted: 2020-11-06

## 2020-11-06 LAB
ABO GROUP BLD: NORMAL
ANTI-D, PASSIVE: NORMAL
ATMOSPHERIC PRESS: ABNORMAL MM[HG]
ATMOSPHERIC PRESS: ABNORMAL MM[HG]
BASE EXCESS BLDCOA CALC-SCNC: -7.6 MMOL/L (ref 0–2)
BASE EXCESS BLDCOV CALC-SCNC: -7.5 MMOL/L (ref 0–2)
BDY SITE: ABNORMAL
BDY SITE: ABNORMAL
BLD GP AB SCN SERPL QL: POSITIVE
BODY TEMPERATURE: 37 C
BODY TEMPERATURE: 37 C
CO2 BLDA-SCNC: 20.2 MMOL/L (ref 22–33)
CO2 BLDA-SCNC: 22 MMOL/L (ref 22–33)
COLLECT TME SMN: ABNORMAL
EPAP: 0
EPAP: 0
HCO3 BLDCOA-SCNC: 20.5 MMOL/L (ref 16.9–20.5)
HCO3 BLDCOV-SCNC: 18.9 MMOL/L (ref 18.6–21.4)
HGB BLDA-MCNC: 17.6 G/DL (ref 14–18)
HGB BLDA-MCNC: 17.6 G/DL (ref 14–18)
INHALED O2 CONCENTRATION: 21 %
INHALED O2 CONCENTRATION: 21 %
IPAP: 0
IPAP: 0
MODALITY: ABNORMAL
MODALITY: ABNORMAL
NOTE: ABNORMAL
NOTE: ABNORMAL
PAW @ PEAK INSP FLOW SETTING VENT: 0 CMH2O
PAW @ PEAK INSP FLOW SETTING VENT: 0 CMH2O
PCO2 BLDCOA: 49.5 MMHG (ref 43.3–54.9)
PCO2 BLDCOV: 40.8 MM HG
PH BLDCOA: 7.23 PH UNITS (ref 7.22–7.3)
PH BLDCOV: 7.28 PH UNITS
PO2 BLDCOA: 16.8 MMHG (ref 11.5–43.3)
PO2 BLDCOV: 20.7 MM HG
POC AMNISURE: POSITIVE
RH BLD: NEGATIVE
SAO2 % BLDCOA: 27.1 %
SAO2 % BLDCOA: ABNORMAL %
SAO2 % BLDCOV: 42.4 %
SARS-COV-2 RNA RESP QL NAA+PROBE: NOT DETECTED
T&S EXPIRATION DATE: NORMAL
TOTAL RATE: 0 BREATHS/MINUTE
TOTAL RATE: 0 BREATHS/MINUTE
VENTILATOR MODE: ABNORMAL

## 2020-11-06 PROCEDURE — 82805 BLOOD GASES W/O2 SATURATION: CPT

## 2020-11-06 PROCEDURE — 59514 CESAREAN DELIVERY ONLY: CPT | Performed by: OBSTETRICS & GYNECOLOGY

## 2020-11-06 PROCEDURE — 59515 CESAREAN DELIVERY: CPT | Performed by: OBSTETRICS & GYNECOLOGY

## 2020-11-06 PROCEDURE — C1755 CATHETER, INTRASPINAL: HCPCS

## 2020-11-06 PROCEDURE — 25010000002 PENICILLIN G POTASSIUM PER 600000 UNITS: Performed by: OBSTETRICS & GYNECOLOGY

## 2020-11-06 PROCEDURE — 25010000002 FENTANYL CITRATE (PF) 100 MCG/2ML SOLUTION: Performed by: NURSE ANESTHETIST, CERTIFIED REGISTERED

## 2020-11-06 PROCEDURE — C1755 CATHETER, INTRASPINAL: HCPCS | Performed by: ANESTHESIOLOGY

## 2020-11-06 PROCEDURE — 25010000002 ONDANSETRON PER 1 MG: Performed by: OBSTETRICS & GYNECOLOGY

## 2020-11-06 PROCEDURE — 25010000002 ROPIVACAINE PER 1 MG: Performed by: NURSE ANESTHETIST, CERTIFIED REGISTERED

## 2020-11-06 PROCEDURE — 25010000002 BUTORPHANOL PER 1 MG: Performed by: OBSTETRICS & GYNECOLOGY

## 2020-11-06 PROCEDURE — 25010000002 METOCLOPRAMIDE PER 10 MG: Performed by: NURSE ANESTHETIST, CERTIFIED REGISTERED

## 2020-11-06 PROCEDURE — 84112 EVAL AMNIOTIC FLUID PROTEIN: CPT | Performed by: OBSTETRICS & GYNECOLOGY

## 2020-11-06 PROCEDURE — 51703 INSERT BLADDER CATH COMPLEX: CPT

## 2020-11-06 PROCEDURE — 25010000002 MIDAZOLAM PER 1 MG: Performed by: ANESTHESIOLOGY

## 2020-11-06 PROCEDURE — 59025 FETAL NON-STRESS TEST: CPT

## 2020-11-06 PROCEDURE — C1765 ADHESION BARRIER: HCPCS | Performed by: OBSTETRICS & GYNECOLOGY

## 2020-11-06 DEVICE — ABSORBABLE ADHESION BARRIER
Type: IMPLANTABLE DEVICE | Status: FUNCTIONAL
Brand: GYNECARE INTERCEED

## 2020-11-06 RX ORDER — METOCLOPRAMIDE HYDROCHLORIDE 5 MG/ML
10 INJECTION INTRAMUSCULAR; INTRAVENOUS ONCE AS NEEDED
Status: COMPLETED | OUTPATIENT
Start: 2020-11-06 | End: 2020-11-06

## 2020-11-06 RX ORDER — METHYLERGONOVINE MALEATE 0.2 MG/ML
200 INJECTION INTRAVENOUS ONCE AS NEEDED
Status: DISCONTINUED | OUTPATIENT
Start: 2020-11-06 | End: 2020-11-06 | Stop reason: HOSPADM

## 2020-11-06 RX ORDER — MISOPROSTOL 200 UG/1
800 TABLET ORAL AS NEEDED
Status: DISCONTINUED | OUTPATIENT
Start: 2020-11-06 | End: 2020-11-06 | Stop reason: HOSPADM

## 2020-11-06 RX ORDER — ONDANSETRON 2 MG/ML
4 INJECTION INTRAMUSCULAR; INTRAVENOUS ONCE
Status: DISCONTINUED | OUTPATIENT
Start: 2020-11-06 | End: 2020-11-06

## 2020-11-06 RX ORDER — IBUPROFEN 600 MG/1
600 TABLET ORAL EVERY 6 HOURS SCHEDULED
Status: DISCONTINUED | OUTPATIENT
Start: 2020-11-07 | End: 2020-11-06

## 2020-11-06 RX ORDER — OXYCODONE AND ACETAMINOPHEN 10; 325 MG/1; MG/1
1 TABLET ORAL EVERY 4 HOURS PRN
Status: DISCONTINUED | OUTPATIENT
Start: 2020-11-06 | End: 2020-11-09 | Stop reason: HOSPADM

## 2020-11-06 RX ORDER — DIPHENHYDRAMINE HYDROCHLORIDE 50 MG/ML
25 INJECTION INTRAMUSCULAR; INTRAVENOUS EVERY 4 HOURS PRN
Status: DISCONTINUED | OUTPATIENT
Start: 2020-11-06 | End: 2020-11-06

## 2020-11-06 RX ORDER — CARBOPROST TROMETHAMINE 250 UG/ML
250 INJECTION, SOLUTION INTRAMUSCULAR AS NEEDED
Status: DISCONTINUED | OUTPATIENT
Start: 2020-11-06 | End: 2020-11-06 | Stop reason: HOSPADM

## 2020-11-06 RX ORDER — DIPHENHYDRAMINE HYDROCHLORIDE 50 MG/ML
25 INJECTION INTRAMUSCULAR; INTRAVENOUS EVERY 6 HOURS PRN
Status: CANCELLED | OUTPATIENT
Start: 2020-11-06

## 2020-11-06 RX ORDER — FENTANYL CITRATE 50 UG/ML
INJECTION, SOLUTION INTRAMUSCULAR; INTRAVENOUS AS NEEDED
Status: DISCONTINUED | OUTPATIENT
Start: 2020-11-06 | End: 2020-11-06 | Stop reason: SURG

## 2020-11-06 RX ORDER — TRISODIUM CITRATE DIHYDRATE AND CITRIC ACID MONOHYDRATE 500; 334 MG/5ML; MG/5ML
30 SOLUTION ORAL ONCE
Status: DISCONTINUED | OUTPATIENT
Start: 2020-11-06 | End: 2020-11-06

## 2020-11-06 RX ORDER — OXYTOCIN 10 [USP'U]/ML
INJECTION, SOLUTION INTRAMUSCULAR; INTRAVENOUS AS NEEDED
Status: DISCONTINUED | OUTPATIENT
Start: 2020-11-06 | End: 2020-11-06 | Stop reason: SURG

## 2020-11-06 RX ORDER — OXYCODONE HYDROCHLORIDE AND ACETAMINOPHEN 5; 325 MG/1; MG/1
1 TABLET ORAL EVERY 4 HOURS PRN
Status: DISCONTINUED | OUTPATIENT
Start: 2020-11-06 | End: 2020-11-09 | Stop reason: HOSPADM

## 2020-11-06 RX ORDER — LIDOCAINE HYDROCHLORIDE AND EPINEPHRINE BITARTRATE 20; .01 MG/ML; MG/ML
INJECTION, SOLUTION SUBCUTANEOUS AS NEEDED
Status: DISCONTINUED | OUTPATIENT
Start: 2020-11-06 | End: 2020-11-06 | Stop reason: SURG

## 2020-11-06 RX ORDER — DIPHENHYDRAMINE HCL 25 MG
25 CAPSULE ORAL EVERY 4 HOURS PRN
Status: DISCONTINUED | OUTPATIENT
Start: 2020-11-06 | End: 2020-11-06

## 2020-11-06 RX ORDER — DIPHENHYDRAMINE HYDROCHLORIDE 50 MG/ML
12.5 INJECTION INTRAMUSCULAR; INTRAVENOUS EVERY 8 HOURS PRN
Status: DISCONTINUED | OUTPATIENT
Start: 2020-11-06 | End: 2020-11-06

## 2020-11-06 RX ORDER — SODIUM CHLORIDE 0.9 % (FLUSH) 0.9 %
1-10 SYRINGE (ML) INJECTION AS NEEDED
Status: DISCONTINUED | OUTPATIENT
Start: 2020-11-06 | End: 2020-11-09 | Stop reason: HOSPADM

## 2020-11-06 RX ORDER — SIMETHICONE 80 MG
80 TABLET,CHEWABLE ORAL 4 TIMES DAILY PRN
Status: DISCONTINUED | OUTPATIENT
Start: 2020-11-06 | End: 2020-11-09 | Stop reason: HOSPADM

## 2020-11-06 RX ORDER — SODIUM CHLORIDE 0.9 % (FLUSH) 0.9 %
3 SYRINGE (ML) INJECTION EVERY 12 HOURS SCHEDULED
Status: DISCONTINUED | OUTPATIENT
Start: 2020-11-06 | End: 2020-11-09 | Stop reason: HOSPADM

## 2020-11-06 RX ORDER — IBUPROFEN 600 MG/1
600 TABLET ORAL EVERY 6 HOURS
Status: DISCONTINUED | OUTPATIENT
Start: 2020-11-06 | End: 2020-11-09 | Stop reason: HOSPADM

## 2020-11-06 RX ORDER — MIDAZOLAM HYDROCHLORIDE 1 MG/ML
INJECTION INTRAMUSCULAR; INTRAVENOUS AS NEEDED
Status: DISCONTINUED | OUTPATIENT
Start: 2020-11-06 | End: 2020-11-06 | Stop reason: SURG

## 2020-11-06 RX ORDER — LANOLIN
CREAM (ML) TOPICAL
Status: DISCONTINUED | OUTPATIENT
Start: 2020-11-06 | End: 2020-11-09 | Stop reason: HOSPADM

## 2020-11-06 RX ORDER — NALOXONE HCL 0.4 MG/ML
0.4 VIAL (ML) INJECTION
Status: DISCONTINUED | OUTPATIENT
Start: 2020-11-06 | End: 2020-11-06

## 2020-11-06 RX ORDER — EPHEDRINE SULFATE 5 MG/ML
10 INJECTION INTRAVENOUS
Status: DISCONTINUED | OUTPATIENT
Start: 2020-11-06 | End: 2020-11-06

## 2020-11-06 RX ORDER — FAMOTIDINE 10 MG/ML
20 INJECTION, SOLUTION INTRAVENOUS ONCE AS NEEDED
Status: COMPLETED | OUTPATIENT
Start: 2020-11-06 | End: 2020-11-06

## 2020-11-06 RX ORDER — ROPIVACAINE HYDROCHLORIDE 2 MG/ML
15 INJECTION, SOLUTION EPIDURAL; INFILTRATION; PERINEURAL CONTINUOUS
Status: DISCONTINUED | OUTPATIENT
Start: 2020-11-06 | End: 2020-11-06

## 2020-11-06 RX ORDER — DOCUSATE SODIUM 100 MG/1
100 CAPSULE, LIQUID FILLED ORAL 2 TIMES DAILY
Status: DISCONTINUED | OUTPATIENT
Start: 2020-11-06 | End: 2020-11-09 | Stop reason: HOSPADM

## 2020-11-06 RX ORDER — ONDANSETRON 4 MG/1
4 TABLET, FILM COATED ORAL EVERY 8 HOURS PRN
Status: DISCONTINUED | OUTPATIENT
Start: 2020-11-06 | End: 2020-11-09 | Stop reason: HOSPADM

## 2020-11-06 RX ORDER — NALOXONE HCL 0.4 MG/ML
0.1 VIAL (ML) INJECTION
Status: CANCELLED | OUTPATIENT
Start: 2020-11-06

## 2020-11-06 RX ORDER — ONDANSETRON 2 MG/ML
4 INJECTION INTRAMUSCULAR; INTRAVENOUS ONCE AS NEEDED
Status: DISCONTINUED | OUTPATIENT
Start: 2020-11-06 | End: 2020-11-06

## 2020-11-06 RX ORDER — LIDOCAINE HYDROCHLORIDE AND EPINEPHRINE 15; 5 MG/ML; UG/ML
INJECTION, SOLUTION EPIDURAL AS NEEDED
Status: DISCONTINUED | OUTPATIENT
Start: 2020-11-06 | End: 2020-11-06 | Stop reason: SURG

## 2020-11-06 RX ORDER — OXYTOCIN-SODIUM CHLORIDE 0.9% IV SOLN 30 UNIT/500ML 30-0.9/5 UT/ML-%
2-28 SOLUTION INTRAVENOUS
Status: DISCONTINUED | OUTPATIENT
Start: 2020-11-06 | End: 2020-11-06

## 2020-11-06 RX ADMIN — FAMOTIDINE 20 MG: 10 INJECTION, SOLUTION INTRAVENOUS at 17:17

## 2020-11-06 RX ADMIN — OXYTOCIN 500 ML: 10 INJECTION INTRAVENOUS at 17:26

## 2020-11-06 RX ADMIN — FENTANYL CITRATE 100 MCG: 50 INJECTION, SOLUTION INTRAMUSCULAR; INTRAVENOUS at 11:58

## 2020-11-06 RX ADMIN — PENICILLIN G 3 MILLION UNITS: 3000000 INJECTION, SOLUTION INTRAVENOUS at 11:55

## 2020-11-06 RX ADMIN — BUTORPHANOL TARTRATE 2 MG: 1 INJECTION, SOLUTION INTRAMUSCULAR; INTRAVENOUS at 09:46

## 2020-11-06 RX ADMIN — OXYTOCIN 20 UNITS: 10 INJECTION, SOLUTION INTRAMUSCULAR; INTRAVENOUS at 17:18

## 2020-11-06 RX ADMIN — ROPIVACAINE HYDROCHLORIDE 10 ML: 5 INJECTION, SOLUTION EPIDURAL; INFILTRATION; PERINEURAL at 12:02

## 2020-11-06 RX ADMIN — IBUPROFEN 600 MG: 600 TABLET, FILM COATED ORAL at 21:55

## 2020-11-06 RX ADMIN — OXYTOCIN 9 MILLI-UNITS/MIN: 10 INJECTION INTRAVENOUS at 16:05

## 2020-11-06 RX ADMIN — OXYTOCIN 20 MILLI-UNITS/MIN: 10 INJECTION INTRAVENOUS at 12:10

## 2020-11-06 RX ADMIN — DOCUSATE SODIUM 100 MG: 100 CAPSULE ORAL at 21:55

## 2020-11-06 RX ADMIN — ROPIVACAINE HYDROCHLORIDE 15 ML/HR: 2 INJECTION, SOLUTION EPIDURAL; INFILTRATION at 12:04

## 2020-11-06 RX ADMIN — SODIUM CHLORIDE, POTASSIUM CHLORIDE, SODIUM LACTATE AND CALCIUM CHLORIDE 125 ML/HR: 600; 310; 30; 20 INJECTION, SOLUTION INTRAVENOUS at 08:18

## 2020-11-06 RX ADMIN — OXYTOCIN 2 MILLI-UNITS/MIN: 10 INJECTION INTRAVENOUS at 04:08

## 2020-11-06 RX ADMIN — PENICILLIN G 3 MILLION UNITS: 3000000 INJECTION, SOLUTION INTRAVENOUS at 03:47

## 2020-11-06 RX ADMIN — LIDOCAINE HYDROCHLORIDE,EPINEPHRINE BITARTRATE 20 ML: 20; .01 INJECTION, SOLUTION INFILTRATION; PERINEURAL at 17:13

## 2020-11-06 RX ADMIN — LIDOCAINE HYDROCHLORIDE AND EPINEPHRINE 3 ML: 15; 5 INJECTION, SOLUTION EPIDURAL at 11:56

## 2020-11-06 RX ADMIN — LIDOCAINE HYDROCHLORIDE AND EPINEPHRINE 2 ML: 15; 5 INJECTION, SOLUTION EPIDURAL at 11:58

## 2020-11-06 RX ADMIN — ONDANSETRON 4 MG: 2 INJECTION INTRAMUSCULAR; INTRAVENOUS at 17:17

## 2020-11-06 RX ADMIN — SODIUM CHLORIDE, POTASSIUM CHLORIDE, SODIUM LACTATE AND CALCIUM CHLORIDE 125 ML/HR: 600; 310; 30; 20 INJECTION, SOLUTION INTRAVENOUS at 12:06

## 2020-11-06 RX ADMIN — OXYCODONE HYDROCHLORIDE AND ACETAMINOPHEN 1 TABLET: 10; 325 TABLET ORAL at 19:31

## 2020-11-06 RX ADMIN — PENICILLIN G 3 MILLION UNITS: 3000000 INJECTION, SOLUTION INTRAVENOUS at 15:55

## 2020-11-06 RX ADMIN — SODIUM CHLORIDE, POTASSIUM CHLORIDE, SODIUM LACTATE AND CALCIUM CHLORIDE 1000 ML: 600; 310; 30; 20 INJECTION, SOLUTION INTRAVENOUS at 11:19

## 2020-11-06 RX ADMIN — OXYCODONE HYDROCHLORIDE AND ACETAMINOPHEN 1 TABLET: 10; 325 TABLET ORAL at 23:34

## 2020-11-06 RX ADMIN — PENICILLIN G 3 MILLION UNITS: 3000000 INJECTION, SOLUTION INTRAVENOUS at 07:05

## 2020-11-06 RX ADMIN — METOCLOPRAMIDE 10 MG: 5 INJECTION, SOLUTION INTRAMUSCULAR; INTRAVENOUS at 17:17

## 2020-11-06 RX ADMIN — MIDAZOLAM 2 MG: 1 INJECTION INTRAMUSCULAR; INTRAVENOUS at 17:18

## 2020-11-06 NOTE — ANESTHESIA POSTPROCEDURE EVALUATION
Patient: Alexia Adam    Procedure Summary     Date: 20 Room / Location: [facility] LABOR DELIVERY   [facility] LABOR DELIVERY    Anesthesia Start: 1146 Anesthesia Stop:     Procedure:  SECTION PRIMARY (N/A Abdomen) Diagnosis:     Surgeon: Madhuri Orellana MD Provider: Tae Suggs MD    Anesthesia Type: epidural ASA Status: 2          Anesthesia Type: epidural    Vitals  Vitals Value Taken Time   /142 20 1749   Temp 98.3 °F (36.8 °C) 20 1602   Pulse 92 20 1653   Resp 16 20 1206   SpO2 98 % 20 1753   Vitals shown include unvalidated device data.        Post Anesthesia Care and Evaluation    Patient location during evaluation: bedside  Patient participation: complete - patient participated  Level of consciousness: awake and alert  Pain score: 0  Pain management: adequate  Airway patency: patent  Anesthetic complications: No anesthetic complications    Cardiovascular status: acceptable  Respiratory status: acceptable  Hydration status: acceptable

## 2020-11-06 NOTE — OP NOTE
[facility]   Section Operative Note    Pre-Operative Dx:   1.  IUP at 38w6d  weeks     2. Non-reassuring fetal status        Postoperative dx:    1.  Same     Procedure: Procedure(s):   SECTION PRIMARY   Surgeon: Madhuri Orellana MD    Assistant: Surgeon(s):  Madhuri Orellana MD Lewellen, Thomas L, DO        Anesthesia: Epidural    EBL:   mls.  600 mL mls.         IV Fluids: 1500 mls.   UOP: 600 mls.       Antibiotics: cefazolin (Ancef)     Infant:            Gender: male  infant    Weight: 3224 g (7 lb 1.7 oz)     Apgars: 8  @ 1 minute /     9  @ 5 minutes    Fetal presentation: cephalic   Amniotic fluid: Clear      Complications:   None      Disposition:   Mother to Mother Baby/Postpartum  in stable condition currently.   Baby to NBN  in stable condition currently.   Indication: Ms. Adam is a 30-year-old G1, P0 patient that presented to labor and delivery at 38 weeks and 5 days gestation with spontaneous rupture membranes.  Her pregnancy had been complicated by SGA where she had been followed with twice weekly monitoring.  Upon arrival patient was noted to be approximately 2 cm.  She was augmented with Pitocin secondary to lack of laboring status.  Throughout her course she had several episodes of late decelerations where the Pitocin had to be high after turned off.  She developed a fetal bradycardic episode that lasted approximately 7 minutes.  She was noted to be 6 to 7 cm which she had been 2 hours prior.  Pitocin was turned off, oxygen was administered, position changes occurred, and the bradycardia had not resolved.  Decision was made to proceed with  delivery secondary to nonreassuring fetal status.  The patient patient was taken to the operating room.  Fetal heart tones in the operating room were noted to be back up into the 150s.  Decision was to made to proceed with  delivery.    Procedure:   The patient was taken to the operating room where epidural  anesthesia was dosed, and she was placed in supine position with a leftward tilt. Sequential compression devices on bilateral lower extremities and a Hallman catheter in her bladder. After being prepped and draped in a normal sterile fashion, a Pfannenstiel skin incision was made with a scalpel and taken down to the level of the fascia using the Bovie. The fascia was incised in the midline and extended laterally. The superior edge of the fascia was grasped with Kocher clamps, elevated and the rectus muscle dissected off. In a similar fashion, the inferior edge of the fascia was grasped with Kocher clamps, elevated and the rectus muscles dissected off. The rectus muscles were bisected in the midline. The peritoneum was identified, grasped and entered into sharply using Metzenbaum scissors. This incision was extended superiorly and inferiorly with good visualization of the bladder. Bladder blade was placed. A bladder flap was created. A low transverse uterine incision was made with a scalpel and extended laterally. Amniotomy with clear fluid occurred at the time of hysterotomy. The head was brought to the uterine incision, and using fundal pressure, the head delivered without complication.  Nuchal cord x2 was reduced.  Nose and mouth were bulb suctioned.  Shoulders and body were to follow. Cord was milked x4, clamped x2 and cut. Infant was handed to the awaiting pediatric team.  Cord gases and blood were obtained. The placenta was manually extracted. The uterus was exteriorized and cleared of all clot and debris and the hysterotomy site was closed with a 1-0 chromic in a running locked fashion starting at the left angle and moving towards the right. Copious irrigation behind the uterus ensued. The uterus was returned to the abdominal cavity. Paracolic gutters were cleared of all clot and debris. The hysterotomy site was noted to be hemostatic as well as the bladder flap and Interceed was placed over this. The peritoneum  was reapproximated using a 2-0 chromic in a running fashion starting superiorly and moving inferiorly. Irrigation over the rectus muscles then occurred with Bovie cauterization of any bleeding sites. The fascia was reapproximated using a looped 0 PDS in a running fashion starting at the left angle and moving towards the right.  The subcutaneous fat layer was hemostatic and closed in an interrupted fashion with 2-0 Plain.  The skin was reapproximated with a 4-0 vicryl on a Jordon needle. All sponge, lap, and needle counts were correct x2. The patient was taken to the recovery room in stable condition.     Assist: Dr. Walters assisted in this procedure with retraction, suction, suturing, and assist in delivering the baby. His expertise was needed in this case.        Madhuri Orellana MD  11/6/2020  18:17 EST

## 2020-11-06 NOTE — ANESTHESIA PREPROCEDURE EVALUATION
Anesthesia Evaluation     Patient summary reviewed and Nursing notes reviewed   NPO Solid Status: > 6 hours  NPO Liquid Status: < 2 hours           Airway   Mallampati: I  TM distance: >3 FB  Neck ROM: full  Dental      Pulmonary    (+) asthma,  Cardiovascular - negative cardio ROS        Neuro/Psych  (+) headaches, numbness (carpal tunnel),     GI/Hepatic/Renal/Endo    (+) obesity,  PUD,      Musculoskeletal     (+) arthralgias, back pain, joint swelling, neck pain,   Abdominal    Substance History - negative use     OB/GYN    (+) Pregnant,         Other   arthritis,                      Anesthesia Plan    ASA 2     epidural       Anesthetic plan, all risks, benefits, and alternatives have been provided, discussed and informed consent has been obtained with: patient.    Plan discussed with attending.

## 2020-11-06 NOTE — H&P
"History and Physical  [facility]    Chief Complaint   Patient presents with   • Leaking Fluid       Patient Active Problem List   Diagnosis   • Headache disorder   • Sequelae of infectious or parasitic diseases   • Paresthesia   • Arthralgia of multiple joints   • Myalgia   • Pupillary abnormalities   • Migraine without aura and without status migrainosus, not intractable   • Neck pain   • Joint swelling   • Fibromyalgia   • DDD (degenerative disc disease), lumbar   • Mechanical low back pain   • Nocturnal leg cramps   • Pregnancy   • False labor before 37 completed weeks of gestation in third trimester   • SGA (small for gestational age), fetal, affecting care of mother, antepartum, third trimester, other fetus   • SGA (small for gestational age), fetal, affecting care of mother, antepartum, third trimester, other fetus   • Normal labor       Alexia Adam is a 30 y.o. year old  with an Estimated Date of Delivery: 20 currently at 38w5d presenting with leaking fluid.    Prenatal care has been with Dr. Apple.  It has been significant for SGA.    No Additional Complaints Reported    The following portions of the patient's history were reviewed and updated as appropriate:vital signs, allergies, current medications, past medical history, past social history, past surgical history and problem list.    Review of Systems  Pertinent items are noted in HPI.     Objective     Ht 157.5 cm (62\")   Wt 95.7 kg (211 lb)   LMP 2020   Breastfeeding Unknown   BMI 38.59 kg/m²     Physical Exam    General:  well developed; well nourished  no acute distress           Abdomen: soft, non-tender; no masses       FHT's: reactive and category 1   Cervix: 2/80   [place]: Contraction are irregular     Lab Review   Labs: No data reviewed   Lab Results (last 24 hours)     ** No results found for the last 24 hours. **          Imaging   No data reviewed   Imaging Results (Most Recent)     None    "     Assessment/Plan     ASSESSMENT  1. IUP at 38w5d  Hospital Problem List     Normal labor      3.  SROM  4.  GBS positive    PLAN  1. Admit for expectant management.  Begin PCN for GBS and eventually pitocin if spontaneous labor does not occur.         Shannan Durham MD  11/5/202022:11 EST

## 2020-11-06 NOTE — PAYOR COMM NOTE
"AdamArunaKaykay JOSIAH (30 y.o. Female)     [employer] ID#[id number]    Patient laboring.    From: Jazmin Barrios  #[phone]  Fax#[phone]      Date of Birth Social Security Number Address Home Phone MRN    1990  [address] [id number] [phone] [id number]    Hoahaoism Marital Status          None Single       Admission Date Admission Type Admitting Provider Attending Provider Department, Room/Bed    11/5/20 Elective Ector Apple MD Owen, Randal Wilson, MD [facility] LABOR DELIVERY, N303/1    Discharge Date Discharge Disposition Discharge Destination                       Attending Provider: Ector Apple MD    Allergies: Bee Venom, Doxycycline, Iodine, Morphine, Nuts, Pantoprazole Sodium, Shellfish-derived Products, Mustard    Isolation: None   Infection: COVID Screen (preop/placement) (11/05/20)   Code Status: CPR    Ht: 157.5 cm (62\")   Wt: 95.7 kg (211 lb)    Admission Cmt: None   Principal Problem: None                Active Insurance as of 11/5/2020     Primary Coverage     Payor Plan Insurance Group Employer/Plan Group    [place] [id number]     Payor Plan Address Payor Plan Phone Number Payor Plan Fax Number Effective Dates    [employer] Claims Office - [address] [address] [phone]  3/1/2020 - None Entered    [facility], located within [facility] [address]       Subscriber Name Subscriber Birth Date Member ID       MADALYNKAYKAY JOSIAH 1990 [id number]                 Emergency Contacts      (Rel.) Home Phone Work Phone Mobile Phone    Miky Adam (Father) [phone] -- [phone]            Insurance Information                [place] Phone: [phone]    Subscriber: Kaykay Adam Subscriber#: [id number]    Group#: [id number] Precert#:           Problem List           Codes Noted - Resolved       Hospital    Normal labor ICD-10-CM: O80, Z37.9  ICD-9-CM: 650 11/5/2020 - Present    "    Non-Hospital    SGA (small for gestational age), fetal, affecting care of mother, antepartum, third trimester, other fetus ICD-10-CM: O36.5939  ICD-9-CM: 656.53 11/3/2020 - Present    SGA (small for gestational age), fetal, affecting care of mother, antepartum, third trimester, other fetus ICD-10-CM: O36.5939  ICD-9-CM: 656.53 10/27/2020 - Present    False labor before 37 completed weeks of gestation in third trimester ICD-10-CM: O47.03  ICD-9-CM: 644.03 10/22/2020 - Present    Pregnancy ICD-10-CM: Z34.90  ICD-9-CM: V22.2 9/26/2020 - Present    Nocturnal leg cramps ICD-10-CM: G47.62  ICD-9-CM: 327.52 12/9/2019 - Present    Fibromyalgia ICD-10-CM: M79.7  ICD-9-CM: 729.1 7/31/2019 - Present    DDD (degenerative disc disease), lumbar ICD-10-CM: M51.36  ICD-9-CM: 722.52 7/31/2019 - Present    Mechanical low back pain ICD-10-CM: M54.5  ICD-9-CM: 724.2 7/31/2019 - Present    Joint swelling ICD-10-CM: M25.40  ICD-9-CM: 719.00 6/19/2019 - Present    Migraine without aura and without status migrainosus, not intractable ICD-10-CM: G43.009  ICD-9-CM: 346.10 3/15/2017 - Present    Neck pain ICD-10-CM: M54.2  ICD-9-CM: 723.1 3/15/2017 - Present    Pupillary abnormalities ICD-10-CM: H21.569  ICD-9-CM: 364.75 8/8/2016 - Present    Headache disorder ICD-10-CM: R51.9  ICD-9-CM: 784.0 5/25/2016 - Present    Sequelae of infectious or parasitic diseases ICD-10-CM: B94.9  ICD-9-CM: 139.8 5/25/2016 - Present    Paresthesia ICD-10-CM: R20.2  ICD-9-CM: 782.0 5/25/2016 - Present    Myalgia ICD-10-CM: M79.10  ICD-9-CM: 729.1 5/25/2016 - Present    Arthralgia of multiple joints ICD-10-CM: M25.50  ICD-9-CM: 719.49 12/28/2015 - Present             History & Physical      Shannan Durham MD at 11/05/20 6373          History and Physical  [facility]    Chief Complaint   Patient presents with   • Leaking Fluid       Patient Active Problem List   Diagnosis   • Headache disorder   • Sequelae of infectious or parasitic diseases  "  • Paresthesia   • Arthralgia of multiple joints   • Myalgia   • Pupillary abnormalities   • Migraine without aura and without status migrainosus, not intractable   • Neck pain   • Joint swelling   • Fibromyalgia   • DDD (degenerative disc disease), lumbar   • Mechanical low back pain   • Nocturnal leg cramps   • Pregnancy   • False labor before 37 completed weeks of gestation in third trimester   • SGA (small for gestational age), fetal, affecting care of mother, antepartum, third trimester, other fetus   • SGA (small for gestational age), fetal, affecting care of mother, antepartum, third trimester, other fetus   • Normal labor       Alexia Adam is a 30 y.o. year old  with an Estimated Date of Delivery: 20 currently at 38w5d presenting with leaking fluid.    Prenatal care has been with Dr. Apple.  It has been significant for SGA.    No Additional Complaints Reported    The following portions of the patient's history were reviewed and updated as appropriate:vital signs, allergies, current medications, past medical history, past social history, past surgical history and problem list.    Review of Systems  Pertinent items are noted in HPI.     Objective     Ht 157.5 cm (62\")   Wt 95.7 kg (211 lb)   LMP 2020   Breastfeeding Unknown   BMI 38.59 kg/m²     Physical Exam    General:  well developed; well nourished  no acute distress           Abdomen: soft, non-tender; no masses       FHT's: reactive and category 1   Cervix: 2/80   [place]: Contraction are irregular     Lab Review   Labs: No data reviewed   Lab Results (last 24 hours)     ** No results found for the last 24 hours. **          Imaging   No data reviewed   Imaging Results (Most Recent)     None        Assessment/Plan     ASSESSMENT  1. IUP at 38w5d  Hospital Problem List     Normal labor      3.  SROM  4.  GBS positive    PLAN  1. Admit for expectant management.  Begin PCN for GBS and eventually pitocin if spontaneous labor does " not occur.         Shannan Durham MD  11/5/202022:11 EST    Electronically signed by Herminio, Shannan HOPE MD at 11/05/20 1624       Vital Signs (last day)     Date/Time   Temp   Temp src   Pulse   Resp   BP   Patient Position   SpO2    11/06/20 1423   --   --   104   --   126/63   --   --    11/06/20 1408   98.5 (36.9)   Oral   86   --   132/73   --   --    11/06/20 1352   --   --   116   --   130/74   --   --    11/06/20 1337   --   --   101   --   128/67   --   --    11/06/20 1237   --   --   93   --   141/80   --   --    11/06/20 1221   --   --   (!) 137   --   129/77   --   --    11/06/20 1214   --   --   (!) 126   --   126/55   --   --    11/06/20 1210   --   --   114   --   131/63   --   --    11/06/20 1208   --   --   116   --   137/69   --   --    11/06/20 1206   98.1 (36.7)   Oral   118   16   134/70   Lying   --    11/06/20 1203   --   --   114   --   168/71   --   --    11/06/20 1159   --   --   117   --   143/86   --   --    11/06/20 1156   --   --   110   --   146/84   --   --    11/06/20 1146   --   --   106   --   147/80   --   --    11/06/20 1041   --   --   67   --   137/68   --   --    11/06/20 0853   --   --   82   --   133/79   --   --    11/06/20 0708   97.9 (36.6)   Oral   75   16   121/66   --   --    11/06/20 0646   --   --   88   --   122/78   --   --    11/06/20 0610   --   --   77   --   129/82   --   --    11/06/20 0535   --   --   90   --   133/80   --   --    11/06/20 0500   --   --   85   --   125/67   --   --    11/06/20 0411   98.1 (36.7)   --   99   18   126/73   --   --    11/06/20 0245   98.4 (36.9)   Oral   --   --   --   --   --    11/06/20 0041   98.5 (36.9)   --   79   16   131/71   --   --    11/05/20 2142   98.6 (37)   --   104   18   131/88   --   --                Current Facility-Administered Medications   Medication Dose Route Frequency Provider Last Rate Last Dose   • acetaminophen (TYLENOL) tablet 650 mg  650 mg Oral Q4H PRN Russell Ibarra MD       • albuterol (PROVENTIL)  nebulizer solution 0.083% 2.5 mg/3mL  2.5 mg Nebulization Q2H PRN , Russell WETZEL MD       • butorphanol (STADOL) injection 1 mg  1 mg Intravenous Q2H PRN Russell Ibarra MD       • butorphanol (STADOL) injection 2 mg  2 mg Intravenous Q2H PRN , Russell WETZEL MD   2 mg at 11/06/20 0946   • diphenhydrAMINE (BENADRYL) injection 12.5 mg  12.5 mg Intravenous Q8H PRN Amy Ibanez CRNA       • ePHEDrine Sulfate 5 MG/ML injection 10 mg  10 mg Intravenous Q10 Min PRN Amy Ibanez CRNA       • famotidine (PEPCID) injection 20 mg  20 mg Intravenous Once PRN Amy Ibanez CRNA       • lactated ringers bolus 1,000 mL  1,000 mL Intravenous PRN Amy Ibanez CRNA       • lactated ringers infusion  125 mL/hr Intravenous Continuous Russell Ibarra  mL/hr at 11/06/20 1206 125 mL/hr at 11/06/20 1206   • lidocaine PF 1% (XYLOCAINE) injection 5 mL  5 mL Intradermal PRN Russell Ibarra MD       • metoclopramide (REGLAN) injection 10 mg  10 mg Intravenous Once PRN Amy Ibanez CRNA       • mineral oil liquid 30 mL  30 mL Topical Once Russell Ibarra MD       • ondansetron (ZOFRAN) tablet 4 mg  4 mg Oral Q6H PRN Russell Ibarra MD        Or   • ondansetron (ZOFRAN) injection 4 mg  4 mg Intravenous Q6H PRN Russell Ibarra MD       • ondansetron (ZOFRAN) injection 4 mg  4 mg Intravenous Once PRN Amy Ibanez CRNA       • oxytocin in sodium chloride (PITOCIN) 30 UNIT/500ML infusion solution  2-28 timbo-units/min Intravenous Titrated Ector Apple MD 14 mL/hr at 11/06/20 1400 14 timbo-units/min at 11/06/20 1400   • penicillin G in iso-osmotic dextrose IVPB 3 million units (premix)  3 Million Units Intravenous Q4H Russell Ibarra  mL/hr at 11/06/20 1155 3 Million Units at 11/06/20 1155   • ropivacaine (NAROPIN) 0.2 % injection  15 mL/hr Epidural Continuous Amy Ibanez CRNA 15 mL/hr at 11/06/20 1204 15 mL/hr  at 11/06/20 1204   • Sod Citrate-Citric Acid (BICITRA) solution 30 mL  30 mL Oral Once Amy Ibanez CRNA       • sodium chloride 0.9 % flush 10 mL  10 mL Intravenous PRN Russell Ibarra MD       • sodium chloride 0.9 % flush 3 mL  3 mL Intravenous Q12H Russell Ibarra MD         Facility-Administered Medications Ordered in Other Encounters   Medication Dose Route Frequency Provider Last Rate Last Dose   • fentaNYL citrate (PF) (SUBLIMAZE) injection   Epidural PRN Amy Ibanez CRNA   100 mcg at 11/06/20 1158   • lidocaine-EPINEPHrine (XYLOCAINE W/EPI) 1.5 %-1:666503 injection   Epidural PRN Amy Ibanez CRNA   2 mL at 11/06/20 1158   • ropivacaine (NAROPIN) 0.5 % 5 mL in sodium chloride 0.9 % 5 mL epidural    Continuous PRN Amy Ibanez CRNA   10 mL at 11/06/20 1202       Lab Results (last 24 hours)     Procedure Component Value Units Date/Time    COVID PRE-OP / PRE-PROCEDURE SCREENING ORDER (NO ISOLATION) - Swab, Nasopharynx [[id number]] Collected: 11/05/20 2253    Specimen: Swab from Nasopharynx Updated: 11/06/20 0557    Narrative:      The following orders were created for panel order COVID PRE-OP / PRE-PROCEDURE SCREENING ORDER (NO ISOLATION) - Swab, Nasopharynx.  Procedure                               Abnormality         Status                     ---------                               -----------         ------                     COVID-19,[facility], NP ...[[id number]]                      In process                   Please view results for these tests on the individual orders.    COVID-19,[facility], NP SWAB IN LEXAR VIRAL TRANSPORT MEDIA 24-30 HR TAT - Swab, Nasopharynx [[id number]] Collected: 11/05/20 2253    Specimen: Swab from Nasopharynx Updated: 11/06/20 0557    POC Amnisure [[id number]] Collected: 11/05/20 2155    Specimen: Amniotic Fluid Updated: 11/06/20 0525     POC Amnisure positive    Preeclampsia Panel [[id number]]  (Abnormal)  Collected: 11/05/20 2248    Specimen: Blood Updated: 11/05/20 2339     Alkaline Phosphatase 243 U/L      ALT (SGPT) 20 U/L      AST (SGOT) 25 U/L      Comment: Specimen hemolyzed.  Results may be affected.        Creatinine 0.54 mg/dL      Total Bilirubin 0.3 mg/dL       U/L      Comment: Specimen hemolyzed.  Results may be affected.        Uric Acid 4.6 mg/dL     CBC (No Diff) [[id number]]  (Abnormal) Collected: 11/05/20 2248    Specimen: Blood Updated: 11/05/20 2307     WBC 11.43 10*3/mm3      RBC 4.41 10*6/mm3      Hemoglobin 12.9 g/dL      Hematocrit 39.4 %      MCV 89.3 fL      MCH 29.3 pg      MCHC 32.7 g/dL      RDW 13.2 %      RDW-SD 43.1 fl      MPV 10.0 fL      Platelets 278 10*3/mm3         Orders (last 24 hrs)      Start     Ordered    11/06/20 1245  ropivacaine (NAROPIN) 0.2 % injection  Continuous      11/06/20 1146    11/06/20 1245  Sod Citrate-Citric Acid (BICITRA) solution 30 mL  Once      11/06/20 1146    11/06/20 1147  Vital Signs Per Anesthesia Guidelines  Per Hospital Policy     Comments: Every 3 Minutes x 20 Minutes following epidural dosing, then if stable every 15 Minutes    11/06/20 1146    11/06/20 1147  Start IV (16 or 18 Gauge)  Once      11/06/20 1146    11/06/20 1147  Fetal Heart Rate Monitor  Once      11/06/20 1146    11/06/20 1147  Nurse or Anesthesiologist to Remain With Patient for 15 Minutes Following Dosing  Continuous      11/06/20 1146    11/06/20 1147  Facilitate Maternal Position on Side & Maintain Uterine Displacement  Continuous      11/06/20 1146    11/06/20 1147  Consult Anesthesia Prior to Changing Epidural Infusion / Rate  Continuous      11/06/20 1146    11/06/20 1146  lactated ringers bolus 1,000 mL  As Needed      11/06/20 1146    11/06/20 1146  ePHEDrine Sulfate 5 MG/ML injection 10 mg  Every 10 Minutes PRN      11/06/20 1146    11/06/20 1146  metoclopramide (REGLAN) injection 10 mg  Once As Needed      11/06/20 1146    11/06/20 1146  ondansetron (ZOFRAN)  injection 4 mg  Once As Needed      11/06/20 1146    11/06/20 1146  famotidine (PEPCID) injection 20 mg  Once As Needed      11/06/20 1146    11/06/20 1146  diphenhydrAMINE (BENADRYL) injection 12.5 mg  Every 8 Hours PRN      11/06/20 1146    11/06/20 0525  POC Amnisure  Once      11/06/20 0525    11/06/20 0445  oxytocin in sodium chloride (PITOCIN) 30 UNIT/500ML infusion solution  Titrated      11/06/20 0353    11/06/20 0250  penicillin G in iso-osmotic dextrose IVPB 3 million units (premix)  Every 4 Hours      11/05/20 2206 11/06/20 0031  Antibody Identification  Once      11/06/20 0030    11/05/20 2300  sodium chloride 0.9 % flush 3 mL  Every 12 Hours Scheduled      11/05/20 2206 11/05/20 2300  lactated ringers infusion  Continuous      11/05/20 2206 11/05/20 2300  mineral oil liquid 30 mL  Once      11/05/20 2206 11/05/20 2300  penicillin g 5 mu/100 mL 0.9% NS IVPB (mbp)  Once      11/05/20 2206 11/05/20 2247  COVID PRE-OP / PRE-PROCEDURE SCREENING ORDER (NO ISOLATION) - Swab, Nasopharynx  Once      11/05/20 2247 11/05/20 2247  COVID-19,[facility], NP SWAB IN LEXAR VIRAL TRANSPORT MEDIA 24-30 HR TAT - Swab, Nasopharynx  PROCEDURE ONCE      11/05/20 2247 11/05/20 2206  ondansetron (ZOFRAN) tablet 4 mg  Every 6 Hours PRN      11/05/20 2206 11/05/20 2206  ondansetron (ZOFRAN) injection 4 mg  Every 6 Hours PRN      11/05/20 2206 11/05/20 2206  butorphanol (STADOL) injection 2 mg  Every 2 Hours PRN      11/05/20 2206    11/05/20 2206  butorphanol (STADOL) injection 1 mg  Every 2 Hours PRN      11/05/20 2206    11/05/20 2206  acetaminophen (TYLENOL) tablet 650 mg  Every 4 Hours PRN      11/05/20 2206 11/05/20 2206  VTE Prophylaxis Not Indicated: Obesity-BMI >30 (1); </= 3 (Low Risk)  Once      11/05/20 2206 11/05/20 2206  Diet Clear Liquid  Diet Effective Now      11/05/20 2206 11/05/20 2206  Preeclampsia Panel  Once      11/05/20 2206 11/05/20 2205  Admit To Obstetrics Inpatient   Once      20  Code Status and Medical Interventions:  Continuous      20  Obtain Informed Consent  Once      20  Vital Signs Per Hospital Policy  Per Hospital Policy      20  Initiate Group Beta Strep (GBS) Prophylaxis Protocol, If Criteria Met  Continuous     Comments: NO TREATMENT RECOMMENDED IF: 1) Maternal GBS Status Known Negative 2) Scheduled  Birth With Intact Membranes, Not in Labor 3) Maternal GBS Status Unknown, No Risk Factors  TREAT WITH ANTIBIOTICS IF:  1) Maternal GBS Status Known Positive 2) Maternal GBS Status Unknown With Risk Factors: a)  Previous Infant Affected By GBS Infection b) GBS Urinary Tract Infection (UTI) or Bacteriuria During Pregnancy c) Unexplained Maternal Fever (100.4F (38C) or Greater) During Labor d)  Prolonged Rupture of Membranes (18 or More Hours) e) Gestational Age Less Than 37 Weeks    20  Mini-Prep Prior to Delivery  Once      20  Continuous Fetal Monitoring With NST on Admission and Prior to Initiation of Oxytocin.  Per Order Details     Comments: Continuous Fetal Monitoring With NST on Admission & Prior to Initiation of Oxytocin.    20  External Uterine Contraction Monitoring  Per Hospital Policy      20  Notify Provider (Specified)  Until Discontinued      20  Notify Provider of Tachysystole (Per Hospital Algorithm)  Until Discontinued      20  Notify Provider if Membranes Ruptured, Bleeding Greater Than 1 Pad Per Hour, Fetal Heart Tone Abnormality or Severe Pain  Until Discontinued      20  CBC (No Diff)  Once      20  Type & Screen  Once      20  Insert Peripheral IV  Once      20  2205  Saline Lock & Maintain IV Access  Continuous      11/05/20 2206 11/05/20 2204  lidocaine PF 1% (XYLOCAINE) injection 5 mL  As Needed      11/05/20 2206 11/05/20 2204  sodium chloride 0.9 % flush 10 mL  As Needed      11/05/20 2206 11/05/20 2204  lactated ringers bolus 1,000 mL  Once As Needed      11/05/20 2206 11/05/20 2204  albuterol (PROVENTIL) nebulizer solution 0.083% 2.5 mg/3mL  Every 2 Hours PRN      11/05/20 2206    Unscheduled  Position Change - For Intra-Uterine Resusitation for Hypertonus, HyperStimulation or Non-Reassuring Fetal Status  As Needed      11/05/20 2206    Signed and Held  Vital Signs Per Hospital Policy  Per Hospital Policy      Signed and Held    Signed and Held  Fundal & Lochia Check  Per Order Details     Comments: Every 15 Minutes x4, Then Every 30 Minutes x2, Then Every Shift    Signed and Held    Signed and Held  Fundal & Lochia Check  Every Shift      Signed and Held    Signed and Held  Notify Provider (Specified)  Until Discontinued      Signed and Held    Signed and Held  Diet Regular  Diet Effective Now      Signed and Held    Signed and Held  oxytocin in sodium chloride (PITOCIN) 30 UNIT/500ML infusion solution  Once      Signed and Held    Signed and Held  oxytocin in sodium chloride (PITOCIN) 30 UNIT/500ML infusion solution  Once      Signed and Held    Signed and Held  methylergonovine (METHERGINE) injection 200 mcg  Once As Needed      Signed and Held    Signed and Held  carboprost (HEMABATE) injection 250 mcg  As Needed      Signed and Held    Signed and Held  miSOPROStol (CYTOTEC) tablet 800 mcg  As Needed      Signed and Held    Signed and Held  Nurse May Remove Epidural Catheter After Delivery  Continuous      Signed and Held    Signed and Held  Transfer to postpartum when discharge criteria met.  Until Discontinued      Signed and Held                Physician Progress Notes (last 24 hours) (Notes from 11/05/20 1446 through 11/06/20 1446)    No notes of  this type exist for this encounter.         Consult Notes (last 24 hours) (Notes from 11/05/20 1446 through 11/06/20 1446)    No notes of this type exist for this encounter.           FHR (last day)     Date/Time Fetal HR Assessment Method Fetal HR (beats/min) Fetal Heart Baseline Rate Fetal HR Variability Fetal HR Accelerations Fetal HR Decelerations Fetal HR Tracing Category    11/06/20 1415  external  140  --  moderate (amplitude range 6 to 25 bpm)  lasting at least 15 seconds;greater than/equal to 15 bpm  late  --    11/06/20 1400  external  130  --  moderate (amplitude range 6 to 25 bpm)  lasting at least 15 seconds;greater than/equal to 15 bpm  absent  --    11/06/20 1345  external  130  --  moderate (amplitude range 6 to 25 bpm)  lasting at least 15 seconds;greater than/equal to 15 bpm  absent  --    11/06/20 1330  external  135  --  minimal (detectable, amplitude less than or equal to 5 bpm)  lasting at least 15 seconds;greater than/equal to 15 bpm  absent  --    11/06/20 1315  external  135  --  minimal (detectable, amplitude less than or equal to 5 bpm)  lasting at least 15 seconds;greater than/equal to 15 bpm  absent  --    11/06/20 1300  external  135  --  moderate (amplitude range 6 to 25 bpm)  lasting at least 15 seconds;greater than/equal to 15 bpm  absent  --    11/06/20 1245  external  135  --  moderate (amplitude range 6 to 25 bpm)  lasting at least 15 seconds;greater than/equal to 15 bpm  absent  --    11/06/20 1230  external  130  --  moderate (amplitude range 6 to 25 bpm)  lasting at least 15 seconds;greater than/equal to 15 bpm  absent  --    11/06/20 1215  external  130  --  moderate (amplitude range 6 to 25 bpm)  lasting at least 15 seconds;greater than/equal to 15 bpm  absent  --    11/06/20 1200  external  130  --  minimal (detectable, amplitude less than or equal to 5 bpm)  lasting at least 15 seconds;greater than/equal to 15 bpm  absent  --    11/06/20 1145  external  130  --  minimal  (detectable, amplitude less than or equal to 5 bpm)  lasting at least 15 seconds;greater than/equal to 15 bpm  early  --    11/06/20 1130  external  130  --  minimal (detectable, amplitude less than or equal to 5 bpm)  lasting at least 15 seconds;greater than/equal to 15 bpm  early  --    11/06/20 1115  external  130  --  minimal (detectable, amplitude less than or equal to 5 bpm)  lasting at least 15 seconds;greater than/equal to 15 bpm  absent  --    11/06/20 1100  external  130  --  minimal (detectable, amplitude less than or equal to 5 bpm)  lasting at least 15 seconds;greater than/equal to 15 bpm  absent  --    11/06/20 1045  external  130  --  minimal (detectable, amplitude less than or equal to 5 bpm)  absent  absent  --    11/06/20 1030  external  130  --  moderate (amplitude range 6 to 25 bpm)  lasting at least 15 seconds;greater than/equal to 15 bpm  absent  --    11/06/20 1015  external  130  --  moderate (amplitude range 6 to 25 bpm)  lasting at least 15 seconds;greater than/equal to 15 bpm  absent  --    11/06/20 1000  external  135  --  moderate (amplitude range 6 to 25 bpm)  lasting at least 15 seconds;greater than/equal to 15 bpm  early  --    11/06/20 0945  external  140  --  minimal (detectable, amplitude less than or equal to 5 bpm)  lasting at least 15 seconds;greater than/equal to 15 bpm  early  --    11/06/20 0930  external  140  --  minimal (detectable, amplitude less than or equal to 5 bpm)  absent  absent  --    11/06/20 0915  external  130  --  moderate (amplitude range 6 to 25 bpm)  greater than/equal to 15 bpm;lasting at least 15 seconds  absent  --    11/06/20 0900  external  140  --  moderate (amplitude range 6 to 25 bpm)  greater than/equal to 15 bpm;lasting at least 15 seconds  absent  --    11/06/20 0845  external  135  --  moderate (amplitude range 6 to 25 bpm)  greater than/equal to 15 bpm;lasting at least 15 seconds  absent  --    11/06/20 0830  external  135  --  moderate  (amplitude range 6 to 25 bpm)  greater than/equal to 15 bpm;lasting at least 15 seconds  absent  --    11/06/20 0815  external  135  --  moderate (amplitude range 6 to 25 bpm)  greater than/equal to 15 bpm;lasting at least 15 seconds  absent  --    11/06/20 0800  external  135  --  moderate (amplitude range 6 to 25 bpm)  greater than/equal to 15 bpm;lasting at least 15 seconds  absent  --    11/06/20 0745  external  135  --  moderate (amplitude range 6 to 25 bpm)  greater than/equal to 15 bpm;lasting at least 15 seconds  absent  --    11/06/20 0730  external  130  --  moderate (amplitude range 6 to 25 bpm)  greater than/equal to 15 bpm;lasting at least 15 seconds  absent  --    11/06/20 0715  external  130  --  moderate (amplitude range 6 to 25 bpm)  greater than/equal to 15 bpm;lasting at least 15 seconds  absent  --    11/06/20 0700  external  130  normal range  moderate (amplitude range 6 to 25 bpm)  greater than/equal to 15 bpm;lasting at least 15 seconds  absent  --    11/06/20 0645  external  130  normal range  moderate (amplitude range 6 to 25 bpm)  greater than/equal to 15 bpm;lasting at least 15 seconds  absent  --    11/06/20 0630  external  135  normal range  moderate (amplitude range 6 to 25 bpm)  greater than/equal to 15 bpm;lasting at least 15 seconds  absent  --    11/06/20 0615  external  130  normal range  moderate (amplitude range 6 to 25 bpm)  greater than/equal to 15 bpm;lasting at least 15 seconds  absent  --    11/06/20 0600  external  130  normal range  moderate (amplitude range 6 to 25 bpm)  greater than/equal to 15 bpm;lasting at least 15 seconds  absent  --    11/06/20 0545  external  135  normal range  moderate (amplitude range 6 to 25 bpm)  greater than/equal to 15 bpm;lasting at least 15 seconds  absent  --    11/06/20 0530  external  135  normal range  moderate (amplitude range 6 to 25 bpm)  greater than/equal to 15 bpm;lasting at least 15 seconds  absent  --    11/06/20 0515  external   135  normal range  moderate (amplitude range 6 to 25 bpm)  greater than/equal to 15 bpm;lasting at least 15 seconds  absent  --    11/06/20 0500  external  135  normal range  moderate (amplitude range 6 to 25 bpm)  greater than/equal to 15 bpm;lasting at least 15 seconds  absent  --    11/06/20 0445  external  135  normal range  moderate (amplitude range 6 to 25 bpm)  greater than/equal to 15 bpm;lasting at least 15 seconds  absent  --    11/06/20 0430  external  130  normal range  moderate (amplitude range 6 to 25 bpm)  greater than/equal to 15 bpm;lasting at least 15 seconds  absent  --    11/06/20 0400  external  140  normal range  moderate (amplitude range 6 to 25 bpm)  greater than/equal to 15 bpm;lasting at least 15 seconds  absent  --    11/06/20 0330  external  130  normal range  moderate (amplitude range 6 to 25 bpm)  greater than/equal to 15 bpm;lasting at least 15 seconds  absent  --    11/06/20 0300  external  130  normal range  moderate (amplitude range 6 to 25 bpm)  greater than/equal to 15 bpm;lasting at least 15 seconds  absent  --    11/06/20 0230  external  130  normal range  moderate (amplitude range 6 to 25 bpm)  greater than/equal to 15 bpm;lasting at least 15 seconds  absent  --    11/06/20 0200  external  130  normal range  moderate (amplitude range 6 to 25 bpm)  greater than/equal to 15 bpm;lasting at least 15 seconds  absent  --    11/06/20 0130  external  135  normal range  moderate (amplitude range 6 to 25 bpm)  greater than/equal to 15 bpm;lasting at least 15 seconds  absent  --    11/06/20 0100  external  140  normal range  moderate (amplitude range 6 to 25 bpm)  greater than/equal to 15 bpm;lasting at least 15 seconds  absent  --    11/06/20 0030  external  135  normal range  moderate (amplitude range 6 to 25 bpm)  greater than/equal to 15 bpm;lasting at least 15 seconds  absent  --    11/06/20 0000  external  140  normal range  moderate (amplitude range 6 to 25 bpm)  greater than/equal  to 15 bpm;lasting at least 15 seconds  absent  --    11/05/20 2345  external  145  normal range  moderate (amplitude range 6 to 25 bpm)  greater than/equal to 15 bpm;lasting at least 15 seconds  absent  --    11/05/20 2330  external  140  normal range  moderate (amplitude range 6 to 25 bpm)  greater than/equal to 15 bpm;lasting at least 15 seconds  absent  --    11/05/20 2315  external  135  normal range  moderate (amplitude range 6 to 25 bpm)  greater than/equal to 15 bpm;lasting at least 15 seconds  absent  --    11/05/20 2300  external  135  normal range  moderate (amplitude range 6 to 25 bpm)  greater than/equal to 15 bpm;lasting at least 15 seconds  absent  --    11/05/20 2245  external  135  normal range  moderate (amplitude range 6 to 25 bpm)  greater than/equal to 15 bpm;lasting at least 15 seconds  absent  --    11/05/20 2230  external  135  normal range  moderate (amplitude range 6 to 25 bpm)  greater than/equal to 15 bpm;lasting at least 15 seconds  absent  --    11/05/20 2215  external  135  normal range  moderate (amplitude range 6 to 25 bpm)  greater than/equal to 15 bpm;lasting at least 15 seconds  absent  --    11/05/20 2200  external  140  normal range  moderate (amplitude range 6 to 25 bpm)  greater than/equal to 15 bpm;lasting at least 15 seconds  absent  --    11/05/20 2145  external  140  normal range  moderate (amplitude range 6 to 25 bpm)  absent  absent  --        Uterine Activity (last day)     Date/Time Method Contraction Frequency (Minutes) Contraction Duration (sec) Contraction Intensity Uterine Resting Tone Contraction Pattern    11/06/20 1415  IUPC (intrauterine pressure catheter)  2-6    --  --  --    11/06/20 1400  IUPC (intrauterine pressure catheter)  2-4    --  --  --    11/06/20 1345  IUPC (intrauterine pressure catheter)  3-4    --  --  --    11/06/20 1330  IUPC (intrauterine pressure catheter)  3-5    --  --  --    11/06/20 1315  IUPC (intrauterine  pressure catheter)  1-4    --  --  --    11/06/20 1300  IUPC (intrauterine pressure catheter)  1-4  60  --  --  --    11/06/20 1245  IUPC (intrauterine pressure catheter)  1-3  60  --  --  --    11/06/20 1230  external tocotransducer  --  --  --  --  --    11/06/20 1215  external tocotransducer  2-4  60-80  --  --  --    11/06/20 1200  external tocotransducer  off for epidrual  --  --  --  --    11/06/20 1145  external tocotransducer  --  --  --  --  --    11/06/20 1130  external tocotransducer  --  --  --  --  --    11/06/20 1115  external tocotransducer  --  --  --  --  --    11/06/20 1100  external tocotransducer  2-3  60-90  --  --  --    11/06/20 1045  external tocotransducer  2-3  60-90  --  --  --    11/06/20 1043  palpation  --  --  mild by palpation  soft by palpation  --    11/06/20 1030  external tocotransducer  2-3  60-90  --  --  --    11/06/20 1015  external tocotransducer  2-3  60-90  --  --  --    11/06/20 1000  external tocotransducer  1-3  60  --  --  --    11/06/20 0945  external tocotransducer  1-3  60-90  --  --  --    11/06/20 0930  external tocotransducer  2-4  60-90  --  --  --    11/06/20 0915  external tocotransducer  1-4  60-90  --  --  --    11/06/20 0900  external tocotransducer  2-3  60-90  --  --  --    11/06/20 0845  external tocotransducer  3-5  60-90  --  --  --    11/06/20 0830  external tocotransducer  2-5  60-90  --  --  --    11/06/20 0815  external tocotransducer  2-5  60-90  --  --  --    11/06/20 0800  external tocotransducer  2-3  60-90  --  --  --    11/06/20 0745  external tocotransducer  2-4  60-90  --  --  --    11/06/20 0730  external tocotransducer  2.5-5  60-80  --  --  --    11/06/20 0715  external tocotransducer  3-6  60-90  --  --  --    11/06/20 0700  external tocotransducer  --  --  --  --  --    11/06/20 0645  external tocotransducer  3-6    --  soft by palpation  --    11/06/20 0630  external tocotransducer  x1  70  --  --  --    11/06/20 0615   external tocotransducer  3-4  50-80  --  --  --    11/06/20 0610  --  --  --  --  soft by palpation  --    11/06/20 0600  external tocotransducer  2-6    --  --  --    11/06/20 0545  external tocotransducer  2-6    --  --  --    11/06/20 0536  --  --  --  --  soft by palpation  --    11/06/20 0530  external tocotransducer  2-6    --  --  --    11/06/20 0515  external tocotransducer  3-4    --  --  --    11/06/20 0500  external tocotransducer  5-7    mild by palpation  soft by palpation  --    11/06/20 0445  external tocotransducer  5    --  --  --    11/06/20 0430  external tocotransducer  2-5  50-90  --  --  --    11/06/20 0400  external tocotransducer  2-4  50-70  --  --  --    11/06/20 0330  external tocotransducer  5-9  70-90  --  --  --    11/06/20 0300  external tocotransducer  2-6    --  --  --    11/06/20 0230  external tocotransducer  5-8    --  --  --    11/06/20 0200  external tocotransducer  4-7    --  --  --    11/06/20 0130  external tocotransducer  3-6    --  --  --    11/06/20 0100  external tocotransducer  5-7  70-90  --  --  --    11/06/20 0030  external tocotransducer  2-7    --  --  --    11/06/20 0000  external tocotransducer  poor tracing  --  --  --  --    11/05/20 2345  external tocotransducer  5    --  --  --    11/05/20 2330  external tocotransducer  4-8    --  --  --    11/05/20 2315  external tocotransducer  5-6    --  --  --    11/05/20 2300  external tocotransducer  --  --  --  --  --    11/05/20 2245  external tocotransducer  x1  80  --  --  --    11/05/20 2230  external tocotransducer  x2  60-70  --  --  --    11/05/20 2215  external tocotransducer  5-7  70-90  --  --  --    11/05/20 2200  external tocotransducer  5-6  70-90  --  --  --    11/05/20 2145  external tocotransducer  --  --  --  soft by palpation  --

## 2020-11-07 LAB
BASOPHILS # BLD AUTO: 0.03 10*3/MM3 (ref 0–0.2)
BASOPHILS NFR BLD AUTO: 0.3 % (ref 0–1.5)
DEPRECATED RDW RBC AUTO: 43.8 FL (ref 37–54)
EOSINOPHIL # BLD AUTO: 0.01 10*3/MM3 (ref 0–0.4)
EOSINOPHIL NFR BLD AUTO: 0.1 % (ref 0.3–6.2)
ERYTHROCYTE [DISTWIDTH] IN BLOOD BY AUTOMATED COUNT: 13.3 % (ref 12.3–15.4)
HCT VFR BLD AUTO: 31 % (ref 34–46.6)
HGB BLD-MCNC: 9.7 G/DL (ref 12–15.9)
IMM GRANULOCYTES # BLD AUTO: 0.06 10*3/MM3 (ref 0–0.05)
IMM GRANULOCYTES NFR BLD AUTO: 0.5 % (ref 0–0.5)
LYMPHOCYTES # BLD AUTO: 2.06 10*3/MM3 (ref 0.7–3.1)
LYMPHOCYTES NFR BLD AUTO: 18.1 % (ref 19.6–45.3)
MCH RBC QN AUTO: 28.3 PG (ref 26.6–33)
MCHC RBC AUTO-ENTMCNC: 31.3 G/DL (ref 31.5–35.7)
MCV RBC AUTO: 90.4 FL (ref 79–97)
MONOCYTES # BLD AUTO: 1.36 10*3/MM3 (ref 0.1–0.9)
MONOCYTES NFR BLD AUTO: 12 % (ref 5–12)
NEUTROPHILS NFR BLD AUTO: 69 % (ref 42.7–76)
NEUTROPHILS NFR BLD AUTO: 7.85 10*3/MM3 (ref 1.7–7)
NRBC BLD AUTO-RTO: 0 /100 WBC (ref 0–0.2)
PLATELET # BLD AUTO: 217 10*3/MM3 (ref 140–450)
PMV BLD AUTO: 9.8 FL (ref 6–12)
RBC # BLD AUTO: 3.43 10*6/MM3 (ref 3.77–5.28)
WBC # BLD AUTO: 11.37 10*3/MM3 (ref 3.4–10.8)

## 2020-11-07 PROCEDURE — 85025 COMPLETE CBC W/AUTO DIFF WBC: CPT | Performed by: OBSTETRICS & GYNECOLOGY

## 2020-11-07 PROCEDURE — 0503F POSTPARTUM CARE VISIT: CPT | Performed by: NURSE PRACTITIONER

## 2020-11-07 RX ADMIN — OXYCODONE HYDROCHLORIDE AND ACETAMINOPHEN 1 TABLET: 10; 325 TABLET ORAL at 10:34

## 2020-11-07 RX ADMIN — OXYCODONE HYDROCHLORIDE AND ACETAMINOPHEN 1 TABLET: 10; 325 TABLET ORAL at 22:16

## 2020-11-07 RX ADMIN — SIMETHICONE 80 MG: 80 TABLET, CHEWABLE ORAL at 08:04

## 2020-11-07 RX ADMIN — IBUPROFEN 600 MG: 600 TABLET, FILM COATED ORAL at 16:06

## 2020-11-07 RX ADMIN — DOCUSATE SODIUM 100 MG: 100 CAPSULE ORAL at 20:41

## 2020-11-07 RX ADMIN — DOCUSATE SODIUM 100 MG: 100 CAPSULE ORAL at 08:04

## 2020-11-07 RX ADMIN — IBUPROFEN 600 MG: 600 TABLET, FILM COATED ORAL at 10:34

## 2020-11-07 RX ADMIN — OXYCODONE HYDROCHLORIDE AND ACETAMINOPHEN 1 TABLET: 10; 325 TABLET ORAL at 04:21

## 2020-11-07 RX ADMIN — IBUPROFEN 600 MG: 600 TABLET, FILM COATED ORAL at 04:21

## 2020-11-07 RX ADMIN — IBUPROFEN 600 MG: 600 TABLET, FILM COATED ORAL at 22:16

## 2020-11-07 RX ADMIN — SIMETHICONE 80 MG: 80 TABLET, CHEWABLE ORAL at 16:06

## 2020-11-07 RX ADMIN — OXYCODONE HYDROCHLORIDE AND ACETAMINOPHEN 1 TABLET: 10; 325 TABLET ORAL at 16:06

## 2020-11-07 NOTE — ANESTHESIA POSTPROCEDURE EVALUATION
Patient: Alexia Adam    Procedure Summary     Date: 20 Room / Location: [facility] LABOR DELIVERY   [facility] LABOR DELIVERY    Anesthesia Start: 1146 Anesthesia Stop:     Procedure:  SECTION PRIMARY (N/A Abdomen) Diagnosis:     Surgeon: Madhuri Orellana MD Provider: Tae Suggs MD    Anesthesia Type: epidural ASA Status: 2          Anesthesia Type: epidural    Vitals  Vitals Value Taken Time   /64 20 0700   Temp 98.3 °F (36.8 °C) 20 0700   Pulse 86 20 0700   Resp 18 20 0700   SpO2 99 % 20   Vitals shown include unvalidated device data.        Post Anesthesia Care and Evaluation    Patient location during evaluation: bedside  Patient participation: complete - patient participated  Level of consciousness: awake  Pain score: 4  Pain management: satisfactory to patient  Airway patency: patent  Anesthetic complications: No anesthetic complications  PONV Status: none  Cardiovascular status: acceptable and hemodynamically stable  Respiratory status: acceptable  Hydration status: acceptable  Post Neuraxial Block status: Motor and sensory function returned to baseline and No signs or symptoms of PDPH

## 2020-11-07 NOTE — PROGRESS NOTES
11/7/2020  POD #1    Subjective   Alexia feels well.  Patient describes her lochia less than menses.  Pain is well controlled.  She is voiding well, having flatus, and tolerating po intake.       Objective   Temp: Temp:  [97.9 °F (36.6 °C)-99.7 °F (37.6 °C)] 98.3 °F (36.8 °C) Temp src: Oral   BP: BP: ()/() 119/64        Pulse: Heart Rate:  [] 86  RR: Resp:  [14-20] 18    General:  No acute distress   Abdomen: Fundus firm and beneath umbilicus, dressing cdi   Pelvis: deferred     Lab Results   Component Value Date    WBC 11.37 (H) 11/07/2020    HGB 9.7 (L) 11/07/2020    HCT 31.0 (L) 11/07/2020    MCV 90.4 11/07/2020     11/07/2020       Assessment  1. POD#1  2. Baby boy well; specialist to circ  3. Rh neg; baby also Rh neg    Plan  1. Routine postpartum care.  2. Advance      This note has been electronically signed.    Homa Coleman, APRN  November 7, 2020

## 2020-11-08 PROCEDURE — 0503F POSTPARTUM CARE VISIT: CPT | Performed by: OBSTETRICS & GYNECOLOGY

## 2020-11-08 RX ADMIN — SIMETHICONE 80 MG: 80 TABLET, CHEWABLE ORAL at 23:56

## 2020-11-08 RX ADMIN — OXYCODONE HYDROCHLORIDE AND ACETAMINOPHEN 1 TABLET: 10; 325 TABLET ORAL at 14:32

## 2020-11-08 RX ADMIN — OXYCODONE HYDROCHLORIDE AND ACETAMINOPHEN 1 TABLET: 10; 325 TABLET ORAL at 06:30

## 2020-11-08 RX ADMIN — IBUPROFEN 600 MG: 600 TABLET, FILM COATED ORAL at 18:07

## 2020-11-08 RX ADMIN — OXYCODONE HYDROCHLORIDE AND ACETAMINOPHEN 1 TABLET: 5; 325 TABLET ORAL at 18:07

## 2020-11-08 RX ADMIN — DOCUSATE SODIUM 100 MG: 100 CAPSULE ORAL at 20:41

## 2020-11-08 RX ADMIN — IBUPROFEN 600 MG: 600 TABLET, FILM COATED ORAL at 23:56

## 2020-11-08 RX ADMIN — IBUPROFEN 600 MG: 600 TABLET, FILM COATED ORAL at 12:21

## 2020-11-08 RX ADMIN — DOCUSATE SODIUM 100 MG: 100 CAPSULE ORAL at 12:21

## 2020-11-08 RX ADMIN — OXYCODONE HYDROCHLORIDE AND ACETAMINOPHEN 1 TABLET: 5; 325 TABLET ORAL at 23:56

## 2020-11-08 RX ADMIN — SIMETHICONE 80 MG: 80 TABLET, CHEWABLE ORAL at 18:07

## 2020-11-08 RX ADMIN — IBUPROFEN 600 MG: 600 TABLET, FILM COATED ORAL at 06:30

## 2020-11-08 NOTE — PROGRESS NOTES
2020    Name:Alexia Adam    MR#:[id number]     PROGRESS NOTE:  Post-Op 1 S/P    HD:3    Subjective   30 y.o. yo Female  s/p CS at 38w6d doing well. Pain well controlled. Tolerating regular diet and having flatus. Lochia normal.     Patient Active Problem List   Diagnosis   • Headache disorder   • Sequelae of infectious or parasitic diseases   • Paresthesia   • Arthralgia of multiple joints   • Myalgia   • Pupillary abnormalities   • Migraine without aura and without status migrainosus, not intractable   • Neck pain   • Joint swelling   • Fibromyalgia   • DDD (degenerative disc disease), lumbar   • Mechanical low back pain   • Nocturnal leg cramps   • Pregnancy   • False labor before 37 completed weeks of gestation in third trimester   • SGA (small for gestational age), fetal, affecting care of mother, antepartum, third trimester, other fetus   • SGA (small for gestational age), fetal, affecting care of mother, antepartum, third trimester, other fetus   • Normal labor   • Delivery of pregnancy by  section   • Fetal bradycardia, antepartum condition or complication        Objective    Vitals  Temp:  Temp:  [97.6 °F (36.4 °C)-98.4 °F (36.9 °C)] 97.6 °F (36.4 °C)  Temp src: Oral  BP:  BP: (116-142)/(57-83) 140/83  Pulse:  Heart Rate:  [71-91] 91  RR:   Resp:  [16-18] 16    General Awake, alert, no distress  Abdomen Soft, non-distended, fundus firm, below umbilicus, appropriately tender  Incision  Intact, no erythema or exudate  Extremities Calves NT bilaterally     I/O last 3 completed shifts:  In: -   Out: 3450 [Urine:3450]    LABS:   Lab Results   Component Value Date    WBC 11.37 (H) 2020    HGB 9.7 (L) 2020    HCT 31.0 (L) 2020    MCV 90.4 2020     2020       Infant: male       Assessment   1.  POD 1    Plan: Doing well.  Routine postoperative care      Active Problems:   None      Madhuri Orellana MD  2020 08:21 EST

## 2020-11-09 VITALS
RESPIRATION RATE: 18 BRPM | HEART RATE: 93 BPM | HEIGHT: 62 IN | DIASTOLIC BLOOD PRESSURE: 76 MMHG | BODY MASS INDEX: 38.83 KG/M2 | TEMPERATURE: 98.1 F | OXYGEN SATURATION: 98 % | SYSTOLIC BLOOD PRESSURE: 132 MMHG | WEIGHT: 211 LBS

## 2020-11-09 PROCEDURE — 0503F POSTPARTUM CARE VISIT: CPT | Performed by: NURSE PRACTITIONER

## 2020-11-09 RX ORDER — IBUPROFEN 600 MG/1
600 TABLET ORAL EVERY 6 HOURS
Qty: 30 TABLET | Refills: 0 | Status: SHIPPED | OUTPATIENT
Start: 2020-11-09

## 2020-11-09 RX ORDER — OXYCODONE HYDROCHLORIDE AND ACETAMINOPHEN 5; 325 MG/1; MG/1
1 TABLET ORAL EVERY 4 HOURS PRN
Qty: 18 TABLET | Refills: 0 | Status: SHIPPED | OUTPATIENT
Start: 2020-11-09 | End: 2020-11-12

## 2020-11-09 RX ADMIN — DOCUSATE SODIUM 100 MG: 100 CAPSULE ORAL at 09:42

## 2020-11-09 RX ADMIN — OXYCODONE HYDROCHLORIDE AND ACETAMINOPHEN 1 TABLET: 5; 325 TABLET ORAL at 05:19

## 2020-11-09 RX ADMIN — OXYCODONE HYDROCHLORIDE AND ACETAMINOPHEN 1 TABLET: 5; 325 TABLET ORAL at 16:10

## 2020-11-09 RX ADMIN — IBUPROFEN 600 MG: 600 TABLET, FILM COATED ORAL at 05:19

## 2020-11-09 RX ADMIN — IBUPROFEN 600 MG: 600 TABLET, FILM COATED ORAL at 11:44

## 2020-11-09 RX ADMIN — OXYCODONE HYDROCHLORIDE AND ACETAMINOPHEN 1 TABLET: 5; 325 TABLET ORAL at 11:44

## 2020-11-09 RX ADMIN — SIMETHICONE 80 MG: 80 TABLET, CHEWABLE ORAL at 09:42

## 2020-11-09 NOTE — DISCHARGE SUMMARY
Discharge Summary    Date of Admission: 2020  Date of Discharge:  2020      Patient: Alexia Adam      MR#:[id number]    Primary Surgeon/OB: Madhuri Orellana MD    Discharge Surgeon/OB:    Presenting Problem/History of Present Illness  Normal labor [O80, Z37.9]     Patient Active Problem List   Diagnosis   • Headache disorder   • Sequelae of infectious or parasitic diseases   • Paresthesia   • Arthralgia of multiple joints   • Myalgia   • Pupillary abnormalities   • Migraine without aura and without status migrainosus, not intractable   • Neck pain   • Joint swelling   • Fibromyalgia   • DDD (degenerative disc disease), lumbar   • Mechanical low back pain   • Nocturnal leg cramps   • Pregnancy   • False labor before 37 completed weeks of gestation in third trimester   • SGA (small for gestational age), fetal, affecting care of mother, antepartum, third trimester, other fetus   • SGA (small for gestational age), fetal, affecting care of mother, antepartum, third trimester, other fetus   • Normal labor   • Delivery of pregnancy by  section   • Fetal bradycardia, antepartum condition or complication         Discharge Diagnosis:  section at 38w6d    Procedures:  , Low Transverse     2020    5:18 PM        Rh Immune globulin given: no    Rubella vaccine given: no    Discharge Date: 2020; Discharge Time: 12:44 EST    Early Discharge:  NO    Hospital Course  Patient is a 30 y.o. female  at 38w6d status post  section with uneventful postoperative recovery.  Patient was advanced to regular diet on postoperative day#1.  On discharge, ambulating, tolerating a regular diet without any difficulties and her incision is dry, clean and intact.     Infant:   male  fetus 3224 g (7 lb 1.7 oz)  with Apgar scores of 8 , 9  at five minutes.    Condition on Discharge:  Stable    Vital Signs  Temp:  [97.6 °F (36.4 °C)-98.1 °F (36.7 °C)] 98.1 °F (36.7 °C)  Heart Rate:   [77-93] 93  Resp:  [16-18] 18  BP: (119-132)/(75-86) 132/76    Lab Results   Component Value Date    WBC 11.37 (H) 11/07/2020    HGB 9.7 (L) 11/07/2020    HCT 31.0 (L) 11/07/2020    MCV 90.4 11/07/2020     11/07/2020   MBT= O neg, Baby O neg  NO circ due to swollen scrotum area    Discharge Disposition  Home or Self Care    Discharge Medications     Discharge Medications      New Medications      Instructions Start Date   ibuprofen 600 MG tablet  Commonly known as: ADVIL,MOTRIN   600 mg, Oral, Every 6 Hours      oxyCODONE-acetaminophen 5-325 MG per tablet  Commonly known as: PERCOCET   1 tablet, Oral, Every 4 Hours PRN         Stop These Medications    acetaminophen 325 MG tablet  Commonly known as: TYLENOL     albuterol sulfate  (90 Base) MCG/ACT inhaler  Commonly known as: PROVENTIL HFA;VENTOLIN HFA;PROAIR HFA     diphenhydrAMINE 25 MG tablet  Commonly known as: BENADRYL     Flinstones Gummies Omega-3 DHA chewable tablet     ondansetron 4 MG tablet  Commonly known as: Zofran     Pramosone 1-2.5 % cream  Generic drug: pramoxine-hydrocortisone     Procto-Med HC 2.5 % rectal cream  Generic drug: Hydrocortisone (Perianal)            Discharge Diet:     Activity at Discharge:   Activity Instructions     Bathing Restrictions      Type of Restriction: Bathing    Bathing Restrictions: Other    Explain Bathing Restrictions: May shower    Driving Restrictions      Type of Restriction: Driving    Driving Restrictions: No Driving    No driving for first 2 weeks post op (or while taking narcotics for post op pain)    Lifting Restrictions      Type of Restriction: Lifting    Lifting Restrictions: Other    Explain Lifing Restrictions: Avoid lifting anything over 15 lbs first several weeks post op    Sexual Activity Restrictions      Type of Restriction: Sex    Explain Sexual Activity Restrictions: Avoid intercourse until after 6 week post op appt.    Work Restrictions      Type of Restriction: Work    May Return to  Work: Other    Return to Work Instructions: May return to work in 6 to 8 weeks post op          Follow-up Appointments  No future appointments.  Additional Instructions for the Follow-ups that You Need to Schedule     Call MD With Problems / Concerns   As directed      Discharge Follow-up with Specified Provider: Appointment with  in 2 weeks; 2 Weeks   As directed      To: Appointment with  in 2 weeks    Follow Up: 2 Weeks               DIMITRI El  11/09/20  12:44 EST  Csd

## 2020-11-09 NOTE — PLAN OF CARE
Goal Outcome Evaluation:         Pt is keeping pain controlled w/ meds.  Incision CDI, light lochia, and VSS.  Bonding well w/ baby.

## 2020-11-10 NOTE — PAYOR COMM NOTE
"Kaykay Adam (30 y.o. Female)     Auth#[id number]    Discharged 11/9/2020 with Baby.    From: Jazmin Barrios  #[phone]  Fax#[phone]      Date of Birth Social Security Number Address Home Phone MRN    1990  [address] [id number] [phone] [id number]    Ad[employer] Marital Status          None Single       Admission Date Admission Type Admitting Provider Attending Provider Department, Room/Bed    11/5/20 Elective Ector Apple MD  [facility] MOTHER BABY 4B, N426/1    Discharge Date Discharge Disposition Discharge Destination        11/9/2020 Home or Self Care              Attending Provider: (none)   Allergies: Bee Venom, Doxycycline, Iodine, Morphine, Nuts, Pantoprazole Sodium, Shellfish-derived Products, Mustard    Isolation: None   Infection: None   Code Status: Prior    Ht: 157.5 cm (62\")   Wt: 95.7 kg (211 lb)    Admission Cmt: None   Principal Problem: None                Active Insurance as of 11/5/2020     Primary Coverage     Payor Plan Insurance Group Employer/Plan Group    [place] [id number]     Payor Plan Address Payor Plan Phone Number Payor Plan Fax Number Effective Dates    [employer] - [address] [address] [phone]  3/1/2020 - None Entered    [facility] [id number]       Subscriber Name Subscriber Birth Date Member ID       KAYKAY ADAM 1990 [id number]                 Emergency Contacts      (Rel.) Home Phone Work Phone Mobile Phone    Miky Adam (Father) [phone] -- [phone]               Discharge Summary      Mikala Hemphill APRN at 11/09/20 1244          Discharge Summary    Date of Admission: 11/5/2020  Date of Discharge:  11/9/2020      Patient: Kaykay Adam      MR#:[id number]    Primary Surgeon/OB: Madhuri Orellana MD    Discharge Surgeon/OB:    Presenting Problem/History of Present Illness  Normal labor [O80, Z37.9]     Patient Active " Problem List   Diagnosis   • Headache disorder   • Sequelae of infectious or parasitic diseases   • Paresthesia   • Arthralgia of multiple joints   • Myalgia   • Pupillary abnormalities   • Migraine without aura and without status migrainosus, not intractable   • Neck pain   • Joint swelling   • Fibromyalgia   • DDD (degenerative disc disease), lumbar   • Mechanical low back pain   • Nocturnal leg cramps   • Pregnancy   • False labor before 37 completed weeks of gestation in third trimester   • SGA (small for gestational age), fetal, affecting care of mother, antepartum, third trimester, other fetus   • SGA (small for gestational age), fetal, affecting care of mother, antepartum, third trimester, other fetus   • Normal labor   • Delivery of pregnancy by  section   • Fetal bradycardia, antepartum condition or complication         Discharge Diagnosis:  section at 38w6d    Procedures:  , Low Transverse     2020    5:18 PM        Rh Immune globulin given: no    Rubella vaccine given: no    Discharge Date: 2020; Discharge Time: 12:44 EST    Early Discharge:  NO    Hospital Course  Patient is a 30 y.o. female  at 38w6d status post  section with uneventful postoperative recovery.  Patient was advanced to regular diet on postoperative day#1.  On discharge, ambulating, tolerating a regular diet without any difficulties and her incision is dry, clean and intact.     Infant:   male  fetus 3224 g (7 lb 1.7 oz)  with Apgar scores of 8 , 9  at five minutes.    Condition on Discharge:  Stable    Vital Signs  Temp:  [97.6 °F (36.4 °C)-98.1 °F (36.7 °C)] 98.1 °F (36.7 °C)  Heart Rate:  [77-93] 93  Resp:  [16-18] 18  BP: (119-132)/(75-86) 132/76    Lab Results   Component Value Date    WBC 11.37 (H) 2020    HGB 9.7 (L) 2020    HCT 31.0 (L) 2020    MCV 90.4 2020     2020   MBT= O neg, Baby O neg  NO circ due to swollen scrotum area    Discharge  Disposition  Home or Self Care    Discharge Medications     Discharge Medications      New Medications      Instructions Start Date   ibuprofen 600 MG tablet  Commonly known as: ADVIL,MOTRIN   600 mg, Oral, Every 6 Hours      oxyCODONE-acetaminophen 5-325 MG per tablet  Commonly known as: PERCOCET   1 tablet, Oral, Every 4 Hours PRN         Stop These Medications    acetaminophen 325 MG tablet  Commonly known as: TYLENOL     albuterol sulfate  (90 Base) MCG/ACT inhaler  Commonly known as: PROVENTIL HFA;VENTOLIN HFA;PROAIR HFA     diphenhydrAMINE 25 MG tablet  Commonly known as: BENADRYL     Flinstones Gummies Omega-3 DHA chewable tablet     ondansetron 4 MG tablet  Commonly known as: Zofran     Pramosone 1-2.5 % cream  Generic drug: pramoxine-hydrocortisone     Procto-Med HC 2.5 % rectal cream  Generic drug: Hydrocortisone (Perianal)            Discharge Diet:     Activity at Discharge:   Activity Instructions     Bathing Restrictions      Type of Restriction: Bathing    Bathing Restrictions: Other    Explain Bathing Restrictions: May shower    Driving Restrictions      Type of Restriction: Driving    Driving Restrictions: No Driving    No driving for first 2 weeks post op (or while taking narcotics for post op pain)    Lifting Restrictions      Type of Restriction: Lifting    Lifting Restrictions: Other    Explain Lifing Restrictions: Avoid lifting anything over 15 lbs first several weeks post op    Sexual Activity Restrictions      Type of Restriction: Sex    Explain Sexual Activity Restrictions: Avoid intercourse until after 6 week post op appt.    Work Restrictions      Type of Restriction: Work    May Return to Work: Other    Return to Work Instructions: May return to work in 6 to 8 weeks post op          Follow-up Appointments  No future appointments.  Additional Instructions for the Follow-ups that You Need to Schedule     Call MD With Problems / Concerns   As directed      Discharge Follow-up with  Specified Provider: Appointment with  in 2 weeks; 2 Weeks   As directed      To: Appointment with  in 2 weeks    Follow Up: 2 Weeks               DIMITRI El  11/09/20  12:44 EST  Csd          Electronically signed by Mikala Hemphill APRN at 11/09/20 1123

## 2020-11-13 ENCOUNTER — OFFICE VISIT (OUTPATIENT)
Dept: OBSTETRICS AND GYNECOLOGY | Facility: CLINIC | Age: 30
End: 2020-11-13

## 2020-11-13 VITALS
WEIGHT: 203 LBS | SYSTOLIC BLOOD PRESSURE: 140 MMHG | DIASTOLIC BLOOD PRESSURE: 86 MMHG | BODY MASS INDEX: 37.36 KG/M2 | TEMPERATURE: 97.7 F | HEIGHT: 62 IN

## 2020-11-13 DIAGNOSIS — L76.82 INCISIONAL PAIN: ICD-10-CM

## 2020-11-13 PROCEDURE — 0503F POSTPARTUM CARE VISIT: CPT | Performed by: NURSE PRACTITIONER

## 2020-11-13 NOTE — PROGRESS NOTES
"Subjective   Chief Complaint   Patient presents with   • Postpartum Care     Alexia Adam is a 30 y.o. year old  presenting to be seen for her postpartum visit.  She had a .  Her son is doing well.    Since delivery she has not been sexually active.  She does not have concerns about post-partum blues/depression.   Concord Score = 0  She is bottle feeding.  For ongoing contraception, her plans are undecided.    The following portions of the patient's history were reviewed and updated as appropriate:current medications and allergies     Pt is here for moderate 7/10 pain in her incision, she denies fever at home.  Pt. States she felt a sharp pain on her right side and now having moderate burning sensation in her incision.  She denies redness or drainage.  Pt. Is 1 wk post op . Pt was delivered by Dr. Orellana,    Social History    Tobacco Use      Smoking status: Never Smoker      Smokeless tobacco: Never Used      Review of Systems  Constitutional POS: nothing reported    NEG: anorexia or night sweats   Genitourinary POS: nothing reported    NEG: dysuria or hematuria      Gastointestinal POS: problems with abd incision     NEG: bloating, change in bowel habits, melena or reflux symptoms   Breast POS: nothing reported    NEG: nothing reported        Objective   /86   Temp 97.7 °F (36.5 °C)   Ht 157.5 cm (62\")   Wt 92.1 kg (203 lb)   LMP 2020   BMI 37.13 kg/m²     General:  well developed; well nourished  no acute distress   Abdomen: no umbilical or inguinal hernias are present  no hepato-splenomegaly  Abdomen appropriately tender, incision healing well, no erythem or drainage, well approximated   Pelvis: Not performed.          Assessment   1. 1 week postop  pain in incision.     Plan   1.  Incision healing well, no s/s infection, well approximated. Advised to avoid heavy lifting, bending. Enc motrin and tylenol prn for pain. Call if pain worsens. Rpt /86. " F/U next week as scheduled.        This note was electronically signed.    Barbara York, APRN  November 13, 2020

## 2020-11-17 ENCOUNTER — OFFICE VISIT (OUTPATIENT)
Dept: OBSTETRICS AND GYNECOLOGY | Facility: CLINIC | Age: 30
End: 2020-11-17

## 2020-11-17 VITALS
DIASTOLIC BLOOD PRESSURE: 88 MMHG | BODY MASS INDEX: 35.51 KG/M2 | WEIGHT: 193 LBS | TEMPERATURE: 97.8 F | HEIGHT: 62 IN | SYSTOLIC BLOOD PRESSURE: 136 MMHG

## 2020-11-17 DIAGNOSIS — R30.0 BURNING WITH URINATION: ICD-10-CM

## 2020-11-17 LAB
BILIRUB BLD-MCNC: NEGATIVE MG/DL
CLARITY, POC: CLEAR
COLOR UR: YELLOW
GLUCOSE UR STRIP-MCNC: NEGATIVE MG/DL
KETONES UR QL: NEGATIVE
LEUKOCYTE EST, POC: ABNORMAL
NITRITE UR-MCNC: NEGATIVE MG/ML
PH UR: 6 [PH] (ref 5–8)
PROT UR STRIP-MCNC: NEGATIVE MG/DL
RBC # UR STRIP: ABNORMAL /UL
SP GR UR: 1.02 (ref 1–1.03)
UROBILINOGEN UR QL: NORMAL

## 2020-11-17 PROCEDURE — 99213 OFFICE O/P EST LOW 20 MIN: CPT | Performed by: NURSE PRACTITIONER

## 2020-11-17 RX ORDER — NITROFURANTOIN 25; 75 MG/1; MG/1
100 CAPSULE ORAL 2 TIMES DAILY
Qty: 14 CAPSULE | Refills: 0 | Status: SHIPPED | OUTPATIENT
Start: 2020-11-17 | End: 2020-11-24

## 2020-11-17 NOTE — PROGRESS NOTES
"Chief Complaint   Patient presents with   • Postpartum Care       6 Week Postpartum Visit         Alexia Adam is a 30 y.o.  s/p , due to non-reassuring FHTs at 38 6/7 weeks on 2020, who presents today for a 2 week postpartum check.      At the time of delivery were you diagnosed with any of the following: None. The incision is tender    Patient denies postpartum depression.  Patient describes bleeding as light.  Patient is bottle feeding.    Patient reports no bowel issues but having bladder issues, burning with urination. CCUA: 3+ blood and 1+ Leukocytes.     Postpartum Depression Screening Questionnaire: 0, no treatment indicated.  Baby Name: Avtar  Baby Weight: 7lbs 2oz  Baby Discharged: Discharged with Mom  Delivering Physician: ENIO    Last Completed Pap Smear       Status Date      PAP SMEAR No completions recorded        Is the patient due for a pap today? No    The additional following portions of the patient's history were reviewed and updated as appropriate: allergies, current medications, past family history, past medical history, past social history, past surgical history and problem list.    Review of Systems   Constitutional: Negative.    HENT: Negative.    Eyes: Negative.    Respiratory: Negative.    Cardiovascular: Negative.    Gastrointestinal: Negative.    Endocrine: Negative.    Genitourinary: Positive for dysuria.   Musculoskeletal: Negative.    Skin: Negative.    Allergic/Immunologic: Negative.    Neurological: Negative.    Hematological: Negative.    Psychiatric/Behavioral: Negative.      All other systems reviewed and are negative.     I have reviewed and agree with the HPI, ROS, and historical information as entered above. Mikala Joby, APRN    /88   Temp 97.8 °F (36.6 °C)   Ht 157.5 cm (62\")   Wt 87.5 kg (193 lb)   LMP 2020   Breastfeeding No   BMI 35.30 kg/m²     Physical Exam  Constitutional:       Appearance: Normal appearance. She is obese. "   Abdominal:      Comments: C/S incision CDI without redness   Neurological:      Mental Status: She is alert.             Assessment and Plan    Problem List Items Addressed This Visit     None      Visit Diagnoses     Postpartum follow-up    -  Primary    Burning with urination        Relevant Orders    POC Urinalysis Dipstick (Completed)          1. S/p , 2 weeks postpartum.  Doing well.    2. Return to normal physical activity.  No pelvic restrictions.   3. Contraception: Not sexually active  4. + dysuria, urine sent for culture. Will start Rx macrobid and increase water intake. Avoid carbonated drinks.   5. RTC in 4 weeks with Dr.Owen Mikala Hemphill, APRN  2020

## 2020-11-19 LAB
BACTERIA UR CULT: NORMAL
BACTERIA UR CULT: NORMAL

## 2020-12-15 ENCOUNTER — OFFICE VISIT (OUTPATIENT)
Dept: OBSTETRICS AND GYNECOLOGY | Facility: CLINIC | Age: 30
End: 2020-12-15

## 2020-12-15 VITALS
SYSTOLIC BLOOD PRESSURE: 128 MMHG | BODY MASS INDEX: 37.17 KG/M2 | WEIGHT: 202 LBS | HEIGHT: 62 IN | TEMPERATURE: 97.7 F | DIASTOLIC BLOOD PRESSURE: 88 MMHG

## 2020-12-15 DIAGNOSIS — Z30.011 ENCOUNTER FOR INITIAL PRESCRIPTION OF CONTRACEPTIVE PILLS: ICD-10-CM

## 2020-12-15 DIAGNOSIS — Z98.891 STATUS POST CESAREAN DELIVERY: ICD-10-CM

## 2020-12-15 PROCEDURE — 0503F POSTPARTUM CARE VISIT: CPT | Performed by: OBSTETRICS & GYNECOLOGY

## 2020-12-15 RX ORDER — ACETAMINOPHEN AND CODEINE PHOSPHATE 120; 12 MG/5ML; MG/5ML
1 SOLUTION ORAL DAILY
Qty: 28 TABLET | Refills: 12 | Status: SHIPPED | OUTPATIENT
Start: 2020-12-15 | End: 2021-12-15

## 2020-12-15 NOTE — PROGRESS NOTES
"Chief Complaint   Patient presents with   • Postpartum Follow-up       6 Week Postpartum Visit         Alexia Adam is a 30 y.o.  s/p , due to non-reassuring FHTs at 38 6/7 weeks on 2020, who presents today for a 6 week postpartum check.      At the time of delivery were you diagnosed with any of the following: None. The incision and is healing well.    Patient denies postpartum depression.  Patient describes bleeding as absent.  Patient is bottle feeding.  Desires contraceptive methods: undecided for contraception.  Patient denies bowel or bladder issues.    Postpartum Depression Screening Questionnaire: 0, no treatment indicated.  Baby Name: Avtar  Baby Weight: 7lbs 2oz  Baby Discharged: Discharged with Mom  Delivering Physician: BRIANDA    Last Completed Pap Smear       Status Date      PAP SMEAR Done 2/15/2018 Negative        Is the patient due for a pap today? Yes.  Performed Today    The additional following portions of the patient's history were reviewed and updated as appropriate: allergies, current medications, past family history, past medical history, past social history, past surgical history and problem list.    Review of Systems   Constitutional: Negative.    HENT: Negative.    Eyes: Negative.    Respiratory: Negative.    Cardiovascular: Negative.    Gastrointestinal: Negative.    Endocrine: Negative.    Genitourinary: Negative.    Musculoskeletal: Negative.    Skin: Negative.    Allergic/Immunologic: Negative.    Neurological: Negative.    Hematological: Negative.    Psychiatric/Behavioral: Negative.      All other systems reviewed and are negative.     I have reviewed and agree with the HPI, ROS, and historical information as entered above. Ecotr Apple MD    /88   Temp 97.7 °F (36.5 °C)   Ht 157.5 cm (62\")   Wt 91.6 kg (202 lb)   LMP 2020   Breastfeeding No   BMI 36.95 kg/m²     Physical Exam   Abdomen soft no hepatosplenomegaly incision healing " well  External genitalia normal vagina normal cervix without lesions  Manual revealed a normal size uterus adnexa clear nontender        Assessment and Plan    Problem List Items Addressed This Visit        Other    Encounter for initial prescription of contraceptive pills    Status post  delivery    Postpartum follow-up - Primary    Relevant Orders    Pap IG, Rfx HPV ASCU      Has not tolerated birth control in the past but will try progesterone only pill for 1 to 2 months, she should take it with food at the same time every day    1. S/p , 6 weeks postpartum.  Doing well.    2. Return to normal physical activity.  No pelvic restrictions.   3. Contraception: contraceptive methods: Oral progesterone-only contraceptive  4. Follow up for Annual.     Ector Apple MD  12/15/2020

## 2021-01-05 ENCOUNTER — POSTPARTUM VISIT (OUTPATIENT)
Dept: OBSTETRICS AND GYNECOLOGY | Facility: CLINIC | Age: 31
End: 2021-01-05

## 2021-01-05 VITALS — HEIGHT: 62 IN | TEMPERATURE: 97.3 F | WEIGHT: 200 LBS | BODY MASS INDEX: 36.8 KG/M2

## 2021-01-05 DIAGNOSIS — M25.50 ARTHRALGIA OF MULTIPLE JOINTS: ICD-10-CM

## 2021-01-05 DIAGNOSIS — R21 RASH: Primary | ICD-10-CM

## 2021-01-05 PROBLEM — L29.9 ITCHING: Status: ACTIVE | Noted: 2021-01-05

## 2021-01-05 PROCEDURE — 99212 OFFICE O/P EST SF 10 MIN: CPT | Performed by: OBSTETRICS & GYNECOLOGY

## 2021-01-05 RX ORDER — METHYLPREDNISOLONE 4 MG/1
TABLET ORAL
Qty: 21 TABLET | Refills: 0 | Status: SHIPPED | OUTPATIENT
Start: 2021-01-05

## 2021-02-04 ENCOUNTER — OFFICE VISIT (OUTPATIENT)
Dept: GASTROENTEROLOGY | Facility: CLINIC | Age: 31
End: 2021-02-04

## 2021-02-04 VITALS
WEIGHT: 205.8 LBS | SYSTOLIC BLOOD PRESSURE: 138 MMHG | HEART RATE: 62 BPM | BODY MASS INDEX: 37.87 KG/M2 | DIASTOLIC BLOOD PRESSURE: 96 MMHG | TEMPERATURE: 97.7 F | HEIGHT: 62 IN

## 2021-02-04 DIAGNOSIS — R10.13 EPIGASTRIC PAIN: Primary | ICD-10-CM

## 2021-02-04 DIAGNOSIS — K20.0 EOSINOPHILIC ESOPHAGITIS: ICD-10-CM

## 2021-02-04 DIAGNOSIS — R93.5 ABNORMAL CT OF THE ABDOMEN: ICD-10-CM

## 2021-02-04 PROCEDURE — 99204 OFFICE O/P NEW MOD 45 MIN: CPT | Performed by: INTERNAL MEDICINE

## 2021-02-04 RX ORDER — TRAMADOL HYDROCHLORIDE 50 MG/1
1 TABLET ORAL AS NEEDED
COMMUNITY
Start: 2021-02-02

## 2021-02-04 RX ORDER — ONDANSETRON 4 MG/1
1 TABLET, ORALLY DISINTEGRATING ORAL AS NEEDED
COMMUNITY
Start: 2021-02-02

## 2021-02-04 NOTE — PROGRESS NOTES
PCP:  Rossana Perez MD     No referring provider defined for this encounter.    Chief Complaint   Patient presents with   • Abdominal Pain     ER F/U   • Vomiting        HPI   The patient is a 30-year-old female with abdominal pain nausea and vomiting.  She did have an attack of severe epigastric pain radiating to her back.  This was 11 or 12:00 at night.  She went to the emergency room and had a CAT scan.  The CAT scan was on 2/2/2021 at [facility].  The CAT scan showed apparent thickening of the jejunal loops with slight stranding suggesting jejunitis.  She to 3.6 cm left ovarian cyst.  She had a redundant colon with mild to moderate fecal retention.  There was no evidence on the CT of cholelithiasis.  Her pain appears to have improved.  She had an ultrasound several years ago which did not show any cholelithiasis.  She has no rectal bleeding.  She has not tolerated H2 blockers or proton pump inhibitors in the past.  Biopsies in the past were negative for celiac disease.  She did have characteristic linear furrows.  She does not complain of dysphagia.    Allergies   Allergen Reactions   • Bee Venom Anaphylaxis   • Doxycycline Swelling and Palpitations     Other reaction(s): Other: See Comments  Muscle Spasms, Tachycardia    • Iodine Cough     Pt began coughing following administration of contrast and pt was recommended to never have again. Pt had this scan at [facility].     • Morphine Hives, Shortness Of Breath, Swelling and Rash     Pt. States she was young and unaware of reaction   • Nuts Anaphylaxis   • Pantoprazole Sodium Hives and Swelling     Muscle Spasms, Hives   • Shellfish-Derived Products Anaphylaxis and Itching   • Mustard Itching     Pt states throat irritation          Current Outpatient Medications:   •  ibuprofen (ADVIL,MOTRIN) 600 MG tablet, Take 1 tablet by mouth Every 6 (Six) Hours., Disp: 30 tablet, Rfl: 0  •  methylPREDNISolone (MEDROL) 4 MG dose pack, Take as  directed on package instructions., Disp: 21 tablet, Rfl: 0  •  norethindrone (MICRONOR) 0.35 MG tablet, Take 1 tablet by mouth Daily., Disp: 28 tablet, Rfl: 12  •  ondansetron ODT (ZOFRAN-ODT) 4 MG disintegrating tablet, Place 1 tablet under the tongue As Needed., Disp: , Rfl:   •  traMADol (ULTRAM) 50 MG tablet, Take 1 tablet by mouth As Needed., Disp: , Rfl:      Past Medical History:   Diagnosis Date   • Asthma    • Carpal tunnel syndrome during pregnancy    • Fibromyalgia    • Lyme disease, acute     Murray Eriberto but sees neurologist, endocrinology and gastro   • Migraine    • Peptic ulcer    • Pupillary abnormalities 2016    resolved       Past Surgical History:   Procedure Laterality Date   •  SECTION N/A 2020    Procedure:  SECTION PRIMARY;  Surgeon: Madhuri Orellana MD;  Location: [facility] LABOR DELIVERY;  Service: Obstetrics/Gynecology;  Laterality: N/A;   • COLONOSCOPY      polyps-normal   • CYST REMOVAL      pt states from brain   • ENDOSCOPY     • TONSILECTOMY, ADENOIDECTOMY, BILATERAL MYRINGOTOMY AND TUBES     • TONSILLECTOMY          Social History     Socioeconomic History   • Marital status: Single     Spouse name: Not on file   • Number of children: Not on file   • Years of education: Not on file   • Highest education level: Not on file   Tobacco Use   • Smoking status: Never Smoker   • Smokeless tobacco: Never Used   Substance and Sexual Activity   • Alcohol use: No   • Drug use: No   • Sexual activity: Yes     Partners: Male        Family History   Problem Relation Age of Onset   • Heart disease Mother         cardiac disorder   • Hypertension Mother    • Cancer Mother    • Brain cancer Maternal Grandmother    • Colon cancer Maternal Grandmother    • Uterine cancer Maternal Grandmother    • Uterine cancer Other    • Breast cancer Other    • Colon polyps Neg Hx         Review of Systems   Constitutional: Negative for activity change, appetite change,  chills, diaphoresis, fatigue, fever, unexpected weight gain and unexpected weight loss.   HENT: Negative for congestion, dental problem, drooling, ear discharge, ear pain, facial swelling, hearing loss, mouth sores, nosebleeds, postnasal drip, rhinorrhea, sinus pressure, sneezing, sore throat, swollen glands, tinnitus, trouble swallowing and voice change.    Respiratory: Negative for apnea, cough, choking, chest tightness, shortness of breath, wheezing and stridor.    Cardiovascular: Negative for chest pain, palpitations and leg swelling.   Gastrointestinal: Positive for abdominal pain. Negative for abdominal distention, anal bleeding, blood in stool, constipation, diarrhea, nausea, rectal pain, vomiting, GERD and indigestion.   Endocrine: Negative for cold intolerance, heat intolerance, polydipsia, polyphagia and polyuria.   Musculoskeletal: Negative for arthralgias, back pain, gait problem, joint swelling, myalgias, neck pain, neck stiffness and bursitis.   Allergic/Immunologic: Negative for environmental allergies, food allergies and immunocompromised state.   Neurological: Negative for dizziness, tremors, seizures, syncope, facial asymmetry, speech difficulty, weakness, light-headedness, numbness, headache, memory problem and confusion.   Hematological: Negative for adenopathy. Does not bruise/bleed easily.   Psychiatric/Behavioral: Negative for agitation, behavioral problems, decreased concentration, dysphoric mood, hallucinations, self-injury, sleep disturbance, suicidal ideas, negative for hyperactivity, depressed mood and stress. The patient is not nervous/anxious.         Vitals:    02/04/21 1335   BP: 138/96   Pulse: 62   Temp: 97.7 °F (36.5 °C)        Physical Exam   General Appearance: Alert, in no acute distress   Head: Normocephalic, without obvious abnormality, atraumatic   Eyes: Lids and lashes normal, conjunctivae and sclerae normal, no icterus, no pallor, corneas clear, PERRLA   Ears: Ears appear  intact with no abnormalities noted   Throat: No oral lesions, no thrush, oral mucosa moist   Neck: No adenopathy, supple, trachea midline, no thyromegaly, no JVD   Lungs: Clear to auscultation,respirations regular, even and unlabored Heart: Regular rhythm and normal rate, normal S1 and S2, no murmur, no gallop, no rub, no click   Chest Wall: Symmetrical respiratory expansion   Abdomen: Normal bowel sounds, no masses, no organomegaly, soft non-tender, non-distended, no guarding, no rebound tenderness   Extremities: Moves all extremities well, no edema, no cyanosis, no redness   Skin: No bleeding, bruising or rash   Neurologic: Cranial nerves 2 - 12 grossly intact, no focal deficits     Review of systems was reviewed and positives are noted. All of the remaining review of systems in that system are negative.    Diagnoses and all orders for this visit:    1. Epigastric pain (Primary)    2. Abnormal CT of the abdomen    3. Eosinophilic esophagitis    Impressions and plan #1 epigastric abdominal pain as well as nausea and vomiting: Thankfully this has improved.  There was some thickening of the jejunal loops.  I will likely be able to reach that with an endoscope.  I would like to reevaluate her upper GI tract.  We will look for peptic ulcer disease.  We will look for EOE.  We will biopsy as needed.  She is not on a proton pump inhibitor at this time.  We have biopsied for celiac disease in the past.  She may be best to have an ultrasound in the future if the upper endoscopy is negative although that would not cause the CT findings.  CAT scans do not see most gallstones.  Ultrasound is a better test from that standpoint.    Catrachito Santamaria MD

## 2021-02-21 ENCOUNTER — APPOINTMENT (OUTPATIENT)
Dept: PREADMISSION TESTING | Facility: HOSPITAL | Age: 31
End: 2021-02-21

## 2021-06-22 ENCOUNTER — OFFICE VISIT (OUTPATIENT)
Dept: OBSTETRICS AND GYNECOLOGY | Facility: CLINIC | Age: 31
End: 2021-06-22

## 2021-06-22 VITALS — BODY MASS INDEX: 37.3 KG/M2 | SYSTOLIC BLOOD PRESSURE: 124 MMHG | WEIGHT: 204 LBS | DIASTOLIC BLOOD PRESSURE: 80 MMHG

## 2021-06-22 DIAGNOSIS — N92.6 IRREGULAR MENSES: ICD-10-CM

## 2021-06-22 DIAGNOSIS — R10.2 PELVIC PAIN: Primary | ICD-10-CM

## 2021-06-22 DIAGNOSIS — M54.59 MECHANICAL LOW BACK PAIN: ICD-10-CM

## 2021-06-22 PROCEDURE — 99213 OFFICE O/P EST LOW 20 MIN: CPT | Performed by: NURSE PRACTITIONER

## 2021-06-22 RX ORDER — ALBUTEROL SULFATE 90 UG/1
2 AEROSOL, METERED RESPIRATORY (INHALATION) EVERY 4 HOURS PRN
COMMUNITY

## 2021-06-22 NOTE — PROGRESS NOTES
No chief complaint on file.      Subjective   HPI  Alexia Adam is a 31 y.o. female, , who presents for cramping, back pain, and absence of period.      She states she has experienced this problem for 2 weeks.  She describes the severity as moderate.  She states that the problem is worsening..  The patient reports additional symptoms as pelvic pain and back pain and absence of period .      Her last LMP was Patient's last menstrual period was 2021..  Periods are regular every 28-30 days, lasting 5-6 days.  Dysmenorrhea:mild, occurring throughout menses.  Patient reports problems with: bloating.  Partner Status: Marital Status: single.  New Partners since last visit: no.  Desires STD Screening: no.    Patient reports having back pain for 2 weeks and reports it is at a 7/10 on a scale of 0-10. She also reports having cramping, she states the cramping became really bad on Thursday and rates it at a 5/10 today. She states she is having all the symptoms of her period but has not started her period. Her last menstrual was on 21. She states she has taken UPT at home and they have come back negative. She states she has not had cramping this bad since she was a teenager. She states the back pain and cramping come and go. Had negative UPT at home yesterday. States she is 6 days late for period. She uses condoms without fail and does not think she is pregnant. CCUA negative in office. States she had a CT scan at [facility] and was told she had an ovarian cyst but she is unsure how large it was.     Additional OB/GYN History   Current contraception: contraceptive methods: Condoms  Desires to: do not start contraception  Last Pap :   Last Completed Pap Smear          PAP SMEAR (Every 3 Years) Next due on 2023  Pap IG, Rfx HPV ASCU    02/15/2018  Done - Negative              History of abnormal Pap smear: no  Last mammogram:   Last Completed Mammogram     This patient has no relevant  Health Maintenance data.        Tobacco Usage?: No   OB History        1    Para   1    Term   1       0    AB   0    Living   1       SAB   0    TAB   0    Ectopic   0    Molar   0    Multiple   0    Live Births   1                Health Maintenance   Topic Date Due   • Annual Gynecologic Pelvic and Breast Exam  Never done   • ANNUAL PHYSICAL  Never done   • Pneumococcal Vaccine 0-64 (1 of 1 - PPSV23) Never done   • COVID-19 Vaccine (1) Never done   • TDAP/TD VACCINES (1 - Tdap) Never done   • HEPATITIS C SCREENING  Never done   • INFLUENZA VACCINE  2021   • PAP SMEAR  2023       The additional following portions of the patient's history were reviewed and updated as appropriate: allergies, current medications, past family history, past medical history, past social history and past surgical history.    Review of Systems   Constitutional: Negative.    Respiratory: Negative.    Gastrointestinal: Negative.    Genitourinary: Positive for menstrual problem and pelvic pain.   Musculoskeletal: Positive for back pain.   Neurological: Negative.    Psychiatric/Behavioral: Negative.        I have reviewed and agree with the HPI, ROS, and historical information as entered above. DIMITRI El    Objective   /80   Wt 92.5 kg (204 lb)   LMP 2021   BMI 37.30 kg/m²     Physical Exam  Constitutional:       Appearance: Normal appearance.   Cardiovascular:      Rate and Rhythm: Normal rate and regular rhythm.   Neurological:      Mental Status: She is alert.         Assessment/Plan     Assessment     Problem List Items Addressed This Visit     None          1. Pelvic pain  2. Back pain  3. Late for current period    Plan     [facility]/ ER records requested for review.   2.   Will RTC on  for GYN ultrasound  3.   CCUA negative in office today      DIMITRI El  2021

## 2021-06-24 ENCOUNTER — OFFICE VISIT (OUTPATIENT)
Dept: OBSTETRICS AND GYNECOLOGY | Facility: CLINIC | Age: 31
End: 2021-06-24

## 2021-06-24 VITALS
DIASTOLIC BLOOD PRESSURE: 72 MMHG | BODY MASS INDEX: 37.91 KG/M2 | SYSTOLIC BLOOD PRESSURE: 118 MMHG | WEIGHT: 206 LBS | HEIGHT: 62 IN

## 2021-06-24 DIAGNOSIS — R10.2 PELVIC PAIN: Primary | ICD-10-CM

## 2021-06-24 PROCEDURE — 99213 OFFICE O/P EST LOW 20 MIN: CPT | Performed by: NURSE PRACTITIONER

## 2021-06-24 NOTE — PROGRESS NOTES
Chief Complaint   Patient presents with   • Follow-up       Subjective   HPI  Alexia Adam is a 31 y.o. female, , who presents for pelvic pain that is constant. She states that last night and this morning she is having severe cramping with no AUB. She also states this morning the worst pain is on the right side. She was seen here two days ago for pelvic pain. SEE ASSESSMENT from 21 visit. She had a CT scan at [facility] in 2021 and showed 3.6 left ovarian cyst and jejunitis.     Her last LMP was Patient's last menstrual period was 2021 (exact date).  Additional OB/GYN History       Last Completed Pap Smear          PAP SMEAR (Every 3 Years) Next due on 2023  Pap IG, Rfx HPV ASCU    02/15/2018  Done - Negative                  Last Completed Mammogram     This patient has no relevant Health Maintenance data.        Tobacco Usage?: No   OB History        1    Para   1    Term   1       0    AB   0    Living   1       SAB   0    TAB   0    Ectopic   0    Molar   0    Multiple   0    Live Births   1                Health Maintenance   Topic Date Due   • Annual Gynecologic Pelvic and Breast Exam  Never done   • ANNUAL PHYSICAL  Never done   • Pneumococcal Vaccine 0-64 (1 of 1 - PPSV23) Never done   • COVID-19 Vaccine (1) Never done   • TDAP/TD VACCINES (1 - Tdap) Never done   • HEPATITIS C SCREENING  Never done   • INFLUENZA VACCINE  2021   • PAP SMEAR  2023       The additional following portions of the patient's history were reviewed and updated as appropriate: current medications, past family history, past medical history, past social history, past surgical history and problem list.    Review of Systems   Genitourinary: Positive for pelvic pain.   All other systems reviewed and are negative.      I have reviewed and agree with the HPI, ROS, and historical information as entered above. Mikala Hemphill, APRN    Objective   /72   Ht 157.5 cm  "(62\")   Wt 93.4 kg (206 lb)   LMP 06/16/2021 (Exact Date)   Breastfeeding No   BMI 37.68 kg/m²     Physical Exam  Constitutional:       Appearance: Normal appearance.   Neurological:      Mental Status: She is alert.   Vitals reviewed.          General:          well developed; well nourished  no acute distress        Assessment/Plan     Assessment     Pelvic pain   GYN ultrasound    Plan     1. Ultrasound findings reviewed with pt. One small follicle noted 14.8 mm on right ovary. EMC= 11.8mm.  8 days late for period. Using condoms without fail and UPTs have been negative.   If not period in next 2 weeks, will take repeat UPT and if negative, can start Provera Rx 10mg x 10 days to induce period. Continue condoms for contraception.       Mikala Hemphill, DIMITRI  06/24/2021  "

## 2022-12-08 ENCOUNTER — OFFICE VISIT (OUTPATIENT)
Dept: OBSTETRICS AND GYNECOLOGY | Facility: CLINIC | Age: 32
End: 2022-12-08

## 2022-12-08 VITALS
WEIGHT: 204.8 LBS | BODY MASS INDEX: 37.69 KG/M2 | DIASTOLIC BLOOD PRESSURE: 78 MMHG | HEIGHT: 62 IN | SYSTOLIC BLOOD PRESSURE: 128 MMHG

## 2022-12-08 DIAGNOSIS — N93.9 ABNORMAL UTERINE BLEEDING (AUB): ICD-10-CM

## 2022-12-08 DIAGNOSIS — N94.10 FEMALE DYSPAREUNIA: ICD-10-CM

## 2022-12-08 DIAGNOSIS — R10.2 PELVIC PAIN: Primary | ICD-10-CM

## 2022-12-08 PROCEDURE — 99213 OFFICE O/P EST LOW 20 MIN: CPT | Performed by: NURSE PRACTITIONER

## 2022-12-08 RX ORDER — NAPROXEN SODIUM 550 MG/1
TABLET ORAL
Qty: 60 TABLET | Refills: 2 | Status: SHIPPED | OUTPATIENT
Start: 2022-12-08

## 2022-12-08 NOTE — PROGRESS NOTES
AUB and Abdominal Pain        HPI  Alexia Adam is a 32 y.o. female, , who presents for initial evaluation evaluation of pelvic pain.      The pain is located in the lower pelvis and it does radiate to her back. She has experienced this problem for 4- 6 months. She describes the pain as cramping and it occurs daily with or without being on her period. She rates her pain score as a 7/10 when at its worst. Patient notes aggravating factors include intercourse and being on her period and alleviating factors are ibuprofen.  The patient reports additional symptoms as abnormal bleeding.  The patient uses 1 Ultra tampons/pads per 1.5 hours. She reports that occasionally she skips a month without having a period. She reports she occasionally has intermenstrual bleeding.  The patient has previously been evaluated for pelvic pain. The patient is sexually active. She has not had new partners.     She has had an IUD before but since being dx with lyme disease they recommended no hormones so she has not been on birth control since her diagnosis. Pt is due for annual and pap today, patient made aware but wishes to come back for annual exam.    Pt. Currently on period. She declines the need for STD testing.     Did the patient have u/s today? Yes.  Findings showed no abnormal findings.  I have personally evaluated the U/S and agree with the findings. DIMITRI El    Patient's last menstrual period was 2022 (exact date)..  Periods are irregular, lasting 9-10 days.  Dysmenorrhea: severe, occurring throughout menses.      Problems with GI: yes - hx of stomach ulcer, swelling in intestines last year  History of urinary disease: no  Tobacco Usage?: No     Additional OB/GYN History   Last Pap :   Last Completed Pap Smear          PAP SMEAR (Every 3 Years) Next due on 2023  Pap IG, Rfx HPV ASCU    02/15/2018  Done - Negative                OB History        1    Para   1     Term   1       0    AB   0    Living   1       SAB   0    IAB   0    Ectopic   0    Molar   0    Multiple   0    Live Births   1                  Current Outpatient Medications:   •  albuterol sulfate  (90 Base) MCG/ACT inhaler, Inhale 2 puffs Every 4 (Four) Hours As Needed for Wheezing., Disp: , Rfl:   •  ibuprofen (ADVIL,MOTRIN) 600 MG tablet, Take 1 tablet by mouth Every 6 (Six) Hours., Disp: 30 tablet, Rfl: 0  •  methylPREDNISolone (MEDROL) 4 MG dose pack, Take as directed on package instructions., Disp: 21 tablet, Rfl: 0  •  naproxen sodium (Anaprox DS) 550 MG tablet, Take 1 tablet po twice a day with meals prn pain, Disp: 60 tablet, Rfl: 2  •  norethindrone (MICRONOR) 0.35 MG tablet, Take 1 tablet by mouth Daily., Disp: 28 tablet, Rfl: 12  •  ondansetron ODT (ZOFRAN-ODT) 4 MG disintegrating tablet, Place 1 tablet under the tongue As Needed., Disp: , Rfl:   •  traMADol (ULTRAM) 50 MG tablet, Take 1 tablet by mouth As Needed., Disp: , Rfl:      Past Medical History:   Diagnosis Date   • Asthma    • Carpal tunnel syndrome during pregnancy    • Fibromyalgia    • Lyme disease, acute     Murray Wei but sees neurologist, endocrinology and gastro   • Migraine    • Peptic ulcer    • Pupillary abnormalities 2016    resolved        Past Surgical History:   Procedure Laterality Date   •  SECTION N/A 2020    Procedure:  SECTION PRIMARY;  Surgeon: Madhuri Orellana MD;  Location: [facility] LABOR DELIVERY;  Service: Obstetrics/Gynecology;  Laterality: N/A;   • COLONOSCOPY      polyps-normal   • CYST REMOVAL      pt states from brain   • ENDOSCOPY     • TONSILECTOMY, ADENOIDECTOMY, BILATERAL MYRINGOTOMY AND TUBES     • TONSILLECTOMY         The additional following portions of the patient's history were reviewed and updated as appropriate: allergies, current medications, past family history, past medical history, past social history, past surgical history and problem  "list.      Review of Systems   Constitutional: Negative.    Cardiovascular: Negative.    Gastrointestinal: Negative.    Genitourinary: Positive for dyspareunia, menstrual problem (abnormal bleeding) and pelvic pain.   Psychiatric/Behavioral: Negative.      All other systems reviewed and are negative.     I have reviewed and agree with the HPI, ROS, and historical information as entered above. Mikala Hemphill, APRN    Objective   /78   Ht 157.5 cm (62\")   Wt 92.9 kg (204 lb 12.8 oz)   LMP 2022 (Exact Date)   BMI 37.46 kg/m²     Physical Exam  Vitals and nursing note reviewed. Exam conducted with a chaperone present.   Constitutional:       Appearance: Normal appearance.   Abdominal:      Palpations: Abdomen is soft.      Tenderness: There is abdominal tenderness in the right lower quadrant.   Genitourinary:     General: Normal vulva.      Labia:         Right: No rash, tenderness, lesion or injury.         Left: No rash, tenderness, lesion or injury.       Vagina: Bleeding present.      Cervix: Cervical bleeding present.      Uterus: Normal. Not enlarged and not tender.       Adnexa: Right adnexa normal and left adnexa normal.      Rectum: Normal. No external hemorrhoid.      Comments: Tenderness is mainly at the end part of  incision (probable scar tissue)  Neurological:      Mental Status: She is alert.         Assessment & Plan     Assessment and Plan    Problem List Items Addressed This Visit    None  Visit Diagnoses     Pelvic pain    -  Primary    Relevant Medications    naproxen sodium (Anaprox DS) 550 MG tablet    Abnormal uterine bleeding (AUB)        Female dyspareunia              1. Ultrasound findings reviewed with pt. EMC= 3.5mm, Cervix appears normal. Ovaries normal, no cysts identified. No free fluid in cul de sac  Rx anaprox given for pain.   Pt. Denies bowel movement issues.   She has a h/o LYME disease and states sensitivity to things is heightened due to this.     Mikala " Joby, DIMITRI  12/08/2022

## 2023-01-05 ENCOUNTER — TELEPHONE (OUTPATIENT)
Dept: OBSTETRICS AND GYNECOLOGY | Facility: CLINIC | Age: 33
End: 2023-01-05

## 2023-01-05 NOTE — TELEPHONE ENCOUNTER
Provider: JANESSA RACHEL  Caller: KAYKAY BERGMAN  Relationship to Patient: SELF  Reason for Call: SAME DAY CANCEL - MOVING

## 2025-05-12 ENCOUNTER — OFFICE VISIT (OUTPATIENT)
Dept: OBSTETRICS AND GYNECOLOGY | Facility: CLINIC | Age: 35
End: 2025-05-12
Payer: COMMERCIAL

## 2025-05-12 VITALS
HEIGHT: 62 IN | BODY MASS INDEX: 39.67 KG/M2 | WEIGHT: 215.6 LBS | DIASTOLIC BLOOD PRESSURE: 92 MMHG | SYSTOLIC BLOOD PRESSURE: 130 MMHG

## 2025-05-12 DIAGNOSIS — R10.32 LLQ PAIN: ICD-10-CM

## 2025-05-12 DIAGNOSIS — R10.2 PELVIC CRAMPING: Primary | ICD-10-CM

## 2025-05-12 LAB
B-HCG UR QL: NEGATIVE
BILIRUB BLD-MCNC: NEGATIVE MG/DL
CLARITY, POC: CLEAR
COLOR UR: YELLOW
EXPIRATION DATE: NORMAL
GLUCOSE UR STRIP-MCNC: NEGATIVE MG/DL
INTERNAL NEGATIVE CONTROL: NORMAL
INTERNAL POSITIVE CONTROL: NORMAL
KETONES UR QL: NEGATIVE
LEUKOCYTE EST, POC: NEGATIVE
Lab: NORMAL
NITRITE UR-MCNC: NEGATIVE MG/ML
PH UR: 5.5 [PH] (ref 5–8)
PROT UR STRIP-MCNC: NEGATIVE MG/DL
RBC # UR STRIP: NEGATIVE /UL
SP GR UR: 1.01 (ref 1–1.03)
UROBILINOGEN UR QL: NORMAL

## 2025-05-12 PROCEDURE — 81025 URINE PREGNANCY TEST: CPT | Performed by: NURSE PRACTITIONER

## 2025-05-12 PROCEDURE — 99213 OFFICE O/P EST LOW 20 MIN: CPT | Performed by: NURSE PRACTITIONER

## 2025-05-12 PROCEDURE — 81002 URINALYSIS NONAUTO W/O SCOPE: CPT | Performed by: NURSE PRACTITIONER

## 2025-05-12 NOTE — PROGRESS NOTES
Chief Complaint   Patient presents with    changes in period       Subjective   HPI  Alexia Adam is a 34 y.o. female, .  Patient's last menstrual period was 2025 (approximate).. who presents to discuss changes with her period.    Patient reports changes with her periods over the last 3-4 months.  She reports that they are still occurring every 27-30 days, but are lighter in flow and having worsening cramping.  She reports that cramping/abdominal discomfort is occurring more than just while on her period, and is worse in her LLQ.      She also reports bilateral breast tenderness and mood changes.     She denies the use of anything for contraception.     Additional OB/GYN History     Last Pap :   Last Completed Pap Smear    This patient has no relevant Health Maintenance data.         Last mammogram:   Last Completed Mammogram    This patient has no relevant Health Maintenance data.         Tobacco Usage?: No   OB History          1    Para   1    Term   1       0    AB   0    Living   1         SAB   0    IAB   0    Ectopic   0    Molar   0    Multiple   0    Live Births   1                  Current Outpatient Medications:     albuterol sulfate  (90 Base) MCG/ACT inhaler, Inhale 2 puffs Every 4 (Four) Hours As Needed for Wheezing., Disp: , Rfl:      Past Medical History:   Diagnosis Date    Asthma     Carpal tunnel syndrome during pregnancy     Fibromyalgia     Lyme disease, acute     Murray Wei but sees neurologist, endocrinology and gastro    Migraine     Peptic ulcer     Pupillary abnormalities 2016    resolved    Varicella         Past Surgical History:   Procedure Laterality Date     SECTION N/A 2020    Procedure:  SECTION PRIMARY;  Surgeon: Madhuri Orellana MD;  Location: [facility] LABOR DELIVERY;  Service: Obstetrics/Gynecology;  Laterality: N/A;    COLONOSCOPY      polyps-normal    CYST REMOVAL      pt states from  "brain    ENDOSCOPY  2003    TONSILECTOMY, ADENOIDECTOMY, BILATERAL MYRINGOTOMY AND TUBES      TONSILLECTOMY         The additional following portions of the patient's history were reviewed and updated as appropriate: allergies, current medications, past family history, past medical history, past social history, past surgical history, and problem list.    Review of Systems   Constitutional: Negative.    Respiratory: Negative.     Cardiovascular: Negative.    Gastrointestinal: Negative.  Negative for constipation and diarrhea.   Genitourinary:  Positive for breast pain and pelvic pain. Negative for breast discharge and breast lump.   Psychiatric/Behavioral:  Positive for agitation and depressed mood.        I have reviewed and agree with the HPI, ROS, and historical information as entered above. Jasmyne Hughes, APRN      Objective   /92   Ht 157.5 cm (62\")   Wt 97.8 kg (215 lb 9.6 oz)   LMP 05/08/2025 (Approximate)   BMI 39.43 kg/m²     Physical Exam  Constitutional:       Appearance: Normal appearance.   Pulmonary:      Effort: Pulmonary effort is normal.   Neurological:      Mental Status: She is alert.         Assessment & Plan     Assessment     Problem List Items Addressed This Visit    None  Visit Diagnoses         Pelvic cramping    -  Primary    Relevant Orders    US Non-ob Transvaginal    POC Urinalysis Dipstick (Completed)    POC Pregnancy, Urine (Completed)      LLQ pain        Relevant Orders    US Non-ob Transvaginal    POC Urinalysis Dipstick (Completed)    POC Pregnancy, Urine (Completed)            CCUA and UPT negative today. Menses still regular but with more frequent cyclic bilateral breast tenderness and pelvic cramping. She is not trying to conceive, using condoms and withdrawal. Will RTO for U/S and annual. We discussed could try slynd to regulate cramping. She had an IUD that migrated and had SOA with ZOFIA.  May not desire any contraceptive but wanted to be sure there was no major " problem.    Plan       Return in about 2 weeks (around 5/26/2025) for Annual physical U/S.        Jasmyne Hughes, DIMITRI  05/12/2025

## (undated) DEVICE — SOL IRR H2O BTL 1000ML STRL

## (undated) DEVICE — MAT PREVALON MOBL TRANSFR AIR WO/PAD 39X80IN

## (undated) DEVICE — SUT VIC 4/0 KS 27IN VCP662H

## (undated) DEVICE — PK C/SECT 10

## (undated) DEVICE — SKIN AFFIX SURG ADHESIVE 72/CS 0.55ML: Brand: MEDLINE

## (undated) DEVICE — SOL IRR NACL 0.9PCT BT 1000ML

## (undated) DEVICE — SUT GUT CHRM 2/0 CT1 27IN 811H

## (undated) DEVICE — SUT GUT CHRM 1 CTX 36IN 905H

## (undated) DEVICE — TRY SPINE BLCK WHITACRE 25G 3X5IN

## (undated) DEVICE — GLV SURG BIOGEL LTX PF 6 1/2

## (undated) DEVICE — SUT PDS 1 TP1 48IN Z880G BX/12

## (undated) DEVICE — COATED VICRYL  (POLYGLACTIN 910) SUTURE, VIOLET BRAIDED, STERILE, SYNTHETIC ABSORBABLE SUTURE: Brand: COATED VICRYL